# Patient Record
Sex: FEMALE | Race: WHITE | NOT HISPANIC OR LATINO | Employment: OTHER | URBAN - NONMETROPOLITAN AREA
[De-identification: names, ages, dates, MRNs, and addresses within clinical notes are randomized per-mention and may not be internally consistent; named-entity substitution may affect disease eponyms.]

---

## 2022-02-17 ENCOUNTER — APPOINTMENT (EMERGENCY)
Dept: CT IMAGING | Facility: HOSPITAL | Age: 82
DRG: 305 | End: 2022-02-17
Payer: COMMERCIAL

## 2022-02-17 ENCOUNTER — HOSPITAL ENCOUNTER (INPATIENT)
Facility: HOSPITAL | Age: 82
LOS: 1 days | Discharge: HOME HEALTH | DRG: 305 | End: 2022-02-19
Attending: STUDENT IN AN ORGANIZED HEALTH CARE EDUCATION/TRAINING PROGRAM | Admitting: STUDENT IN AN ORGANIZED HEALTH CARE EDUCATION/TRAINING PROGRAM
Payer: COMMERCIAL

## 2022-02-17 DIAGNOSIS — H53.9 VISUAL CHANGES: Primary | ICD-10-CM

## 2022-02-17 DIAGNOSIS — R29.90 STROKE-LIKE SYMPTOM: ICD-10-CM

## 2022-02-17 DIAGNOSIS — I63.9 CVA (CEREBRAL VASCULAR ACCIDENT) (HCC): ICD-10-CM

## 2022-02-17 LAB — GLUCOSE SERPL-MCNC: 177 MG/DL (ref 65–140)

## 2022-02-17 PROCEDURE — 70496 CT ANGIOGRAPHY HEAD: CPT

## 2022-02-17 PROCEDURE — 82948 REAGENT STRIP/BLOOD GLUCOSE: CPT

## 2022-02-17 PROCEDURE — 99291 CRITICAL CARE FIRST HOUR: CPT | Performed by: STUDENT IN AN ORGANIZED HEALTH CARE EDUCATION/TRAINING PROGRAM

## 2022-02-17 PROCEDURE — 70498 CT ANGIOGRAPHY NECK: CPT

## 2022-02-17 PROCEDURE — 99285 EMERGENCY DEPT VISIT HI MDM: CPT

## 2022-02-17 PROCEDURE — G1004 CDSM NDSC: HCPCS

## 2022-02-18 ENCOUNTER — APPOINTMENT (INPATIENT)
Dept: NON INVASIVE DIAGNOSTICS | Facility: HOSPITAL | Age: 82
DRG: 305 | End: 2022-02-18
Payer: COMMERCIAL

## 2022-02-18 ENCOUNTER — APPOINTMENT (INPATIENT)
Dept: CT IMAGING | Facility: HOSPITAL | Age: 82
DRG: 305 | End: 2022-02-18
Payer: COMMERCIAL

## 2022-02-18 ENCOUNTER — APPOINTMENT (INPATIENT)
Dept: MRI IMAGING | Facility: HOSPITAL | Age: 82
DRG: 305 | End: 2022-02-18
Payer: COMMERCIAL

## 2022-02-18 PROBLEM — H34.11 CENTRAL RETINAL ARTERY OCCLUSION OF RIGHT EYE: Status: ACTIVE | Noted: 2022-02-18

## 2022-02-18 PROBLEM — Z86.73 H/O: STROKE: Status: ACTIVE | Noted: 2022-02-18

## 2022-02-18 PROBLEM — I10 ESSENTIAL HYPERTENSION: Status: ACTIVE | Noted: 2022-02-18

## 2022-02-18 PROBLEM — R29.90 STROKE-LIKE SYMPTOM: Status: ACTIVE | Noted: 2022-02-18

## 2022-02-18 LAB
ANION GAP SERPL CALCULATED.3IONS-SCNC: 6 MMOL/L (ref 4–13)
AORTIC ROOT: 3.4 CM
APICAL FOUR CHAMBER EJECTION FRACTION: 69 %
APTT PPP: 27 SECONDS (ref 23–37)
ASCENDING AORTA: 3.4 CM (ref 1.96–2.94)
BUN SERPL-MCNC: 21 MG/DL (ref 5–25)
CALCIUM SERPL-MCNC: 8.2 MG/DL (ref 8.3–10.1)
CARDIAC TROPONIN I PNL SERPL HS: 3 NG/L
CHLORIDE SERPL-SCNC: 104 MMOL/L (ref 100–108)
CHOLEST SERPL-MCNC: 156 MG/DL
CO2 SERPL-SCNC: 28 MMOL/L (ref 21–32)
CREAT SERPL-MCNC: 0.93 MG/DL (ref 0.6–1.3)
CRP SERPL QL: 0.5 MG/L
E WAVE DECELERATION TIME: 160 MS
ERYTHROCYTE [DISTWIDTH] IN BLOOD BY AUTOMATED COUNT: 13.5 % (ref 11.6–15.1)
ERYTHROCYTE [SEDIMENTATION RATE] IN BLOOD: 3 MM/HOUR (ref 0–29)
EST. AVERAGE GLUCOSE BLD GHB EST-MCNC: 120 MG/DL
FRACTIONAL SHORTENING: 38 % (ref 28–44)
GFR SERPL CREATININE-BSD FRML MDRD: 57 ML/MIN/1.73SQ M
GLUCOSE SERPL-MCNC: 143 MG/DL (ref 65–140)
HBA1C MFR BLD: 5.8 %
HCT VFR BLD AUTO: 35.6 % (ref 34.8–46.1)
HDLC SERPL-MCNC: 49 MG/DL
HGB BLD-MCNC: 11.5 G/DL (ref 11.5–15.4)
INR PPP: 1.03 (ref 0.84–1.19)
INTERVENTRICULAR SEPTUM IN DIASTOLE (PARASTERNAL SHORT AXIS VIEW): 1.2 CM (ref 0.52–0.97)
INTERVENTRICULAR SEPTUM: 1.2 CM (ref 0.6–1.1)
LAAS-AP2: 14.8 CM2
LAAS-AP4: 21 CM2
LDLC SERPL CALC-MCNC: 85 MG/DL (ref 0–100)
LEFT ATRIUM SIZE: 2.9 CM
LEFT INTERNAL DIMENSION IN SYSTOLE: 2.3 CM (ref 2.61–3.96)
LEFT VENTRICLE DIASTOLIC VOLUME (MOD BIPLANE): 40 ML (ref 86.06–193.81)
LEFT VENTRICLE SYSTOLIC VOLUME (MOD BIPLANE): 9 ML
LEFT VENTRICULAR INTERNAL DIMENSION IN DIASTOLE: 3.7 CM (ref 4.27–6.36)
LEFT VENTRICULAR POSTERIOR WALL IN END DIASTOLE: 1 CM (ref 0.51–0.96)
LEFT VENTRICULAR STROKE VOLUME: 38 ML
LV EF: 78 %
LVSV (TEICH): 38 ML
MCH RBC QN AUTO: 29.6 PG (ref 26.8–34.3)
MCHC RBC AUTO-ENTMCNC: 32.3 G/DL (ref 31.4–37.4)
MCV RBC AUTO: 92 FL (ref 82–98)
MV E'TISSUE VEL-LAT: 9 CM/S
MV PEAK A VEL: 1.23 M/S
MV PEAK E VEL: 88 CM/S
MV STENOSIS PRESSURE HALF TIME: 47 MS
MV VALVE AREA P 1/2 METHOD: 4.68 CM2
PLATELET # BLD AUTO: 218 THOUSANDS/UL (ref 149–390)
PMV BLD AUTO: 11.2 FL (ref 8.9–12.7)
POTASSIUM SERPL-SCNC: 3.8 MMOL/L (ref 3.5–5.3)
PROTHROMBIN TIME: 13.4 SECONDS (ref 11.6–14.5)
RBC # BLD AUTO: 3.88 MILLION/UL (ref 3.81–5.12)
RIGHT ATRIAL 2D VOLUME: 14 ML
RIGHT ATRIUM AREA SYSTOLE A4C: 7.9 CM2
RIGHT VENTRICLE ID DIMENSION: 2.6 CM
SL CV LEFT ATRIUM LENGTH A2C: 5.3 CM
SL CV LV DIAS VOL ENDO Z SCORE: -3.71
SL CV LV EF: 65
SL CV PED ECHO LEFT VENTRICLE DIASTOLIC VOLUME (MOD BIPLANE) 2D: 56 ML
SL CV PED ECHO LEFT VENTRICLE SYSTOLIC VOLUME (MOD BIPLANE) 2D: 18 ML
SODIUM SERPL-SCNC: 138 MMOL/L (ref 136–145)
TR MAX PG: 26 MMHG
TR PEAK VELOCITY: 2.6 M/S
TRICUSPID VALVE PEAK REGURGITATION VELOCITY: 2.55 M/S
TRIGL SERPL-MCNC: 111 MG/DL
WBC # BLD AUTO: 6.66 THOUSAND/UL (ref 4.31–10.16)
Z-SCORE OF ASCENDING AORTA: 3.87
Z-SCORE OF INTERVENTRICULAR SEPTUM IN END DIASTOLE: 3.94
Z-SCORE OF LEFT VENTRICULAR DIMENSION IN END DIASTOLE: -3.43
Z-SCORE OF LEFT VENTRICULAR DIMENSION IN END SYSTOLE: -2.65
Z-SCORE OF LEFT VENTRICULAR POSTERIOR WALL IN END DIASTOLE: 2.32

## 2022-02-18 PROCEDURE — 97163 PT EVAL HIGH COMPLEX 45 MIN: CPT

## 2022-02-18 PROCEDURE — 83036 HEMOGLOBIN GLYCOSYLATED A1C: CPT | Performed by: PHYSICIAN ASSISTANT

## 2022-02-18 PROCEDURE — 70450 CT HEAD/BRAIN W/O DYE: CPT

## 2022-02-18 PROCEDURE — 08C0XZZ EXTIRPATION OF MATTER FROM RIGHT EYE, EXTERNAL APPROACH: ICD-10-PCS | Performed by: ANESTHESIOLOGY

## 2022-02-18 PROCEDURE — 80061 LIPID PANEL: CPT | Performed by: NURSE PRACTITIONER

## 2022-02-18 PROCEDURE — G1004 CDSM NDSC: HCPCS

## 2022-02-18 PROCEDURE — 80048 BASIC METABOLIC PNL TOTAL CA: CPT | Performed by: STUDENT IN AN ORGANIZED HEALTH CARE EDUCATION/TRAINING PROGRAM

## 2022-02-18 PROCEDURE — 96374 THER/PROPH/DIAG INJ IV PUSH: CPT

## 2022-02-18 PROCEDURE — 93306 TTE W/DOPPLER COMPLETE: CPT | Performed by: STUDENT IN AN ORGANIZED HEALTH CARE EDUCATION/TRAINING PROGRAM

## 2022-02-18 PROCEDURE — 92523 SPEECH SOUND LANG COMPREHEN: CPT

## 2022-02-18 PROCEDURE — 85027 COMPLETE CBC AUTOMATED: CPT | Performed by: STUDENT IN AN ORGANIZED HEALTH CARE EDUCATION/TRAINING PROGRAM

## 2022-02-18 PROCEDURE — 70551 MRI BRAIN STEM W/O DYE: CPT

## 2022-02-18 PROCEDURE — 86140 C-REACTIVE PROTEIN: CPT | Performed by: STUDENT IN AN ORGANIZED HEALTH CARE EDUCATION/TRAINING PROGRAM

## 2022-02-18 PROCEDURE — 85610 PROTHROMBIN TIME: CPT | Performed by: STUDENT IN AN ORGANIZED HEALTH CARE EDUCATION/TRAINING PROGRAM

## 2022-02-18 PROCEDURE — 97167 OT EVAL HIGH COMPLEX 60 MIN: CPT

## 2022-02-18 PROCEDURE — 36415 COLL VENOUS BLD VENIPUNCTURE: CPT | Performed by: STUDENT IN AN ORGANIZED HEALTH CARE EDUCATION/TRAINING PROGRAM

## 2022-02-18 PROCEDURE — 99291 CRITICAL CARE FIRST HOUR: CPT | Performed by: PHYSICIAN ASSISTANT

## 2022-02-18 PROCEDURE — G0427 INPT/ED TELECONSULT70: HCPCS | Performed by: PSYCHIATRY & NEUROLOGY

## 2022-02-18 PROCEDURE — 97116 GAIT TRAINING THERAPY: CPT

## 2022-02-18 PROCEDURE — 93306 TTE W/DOPPLER COMPLETE: CPT

## 2022-02-18 PROCEDURE — 85730 THROMBOPLASTIN TIME PARTIAL: CPT | Performed by: STUDENT IN AN ORGANIZED HEALTH CARE EDUCATION/TRAINING PROGRAM

## 2022-02-18 PROCEDURE — 93005 ELECTROCARDIOGRAM TRACING: CPT

## 2022-02-18 PROCEDURE — 92610 EVALUATE SWALLOWING FUNCTION: CPT

## 2022-02-18 PROCEDURE — 3E03317 INTRODUCTION OF OTHER THROMBOLYTIC INTO PERIPHERAL VEIN, PERCUTANEOUS APPROACH: ICD-10-PCS | Performed by: STUDENT IN AN ORGANIZED HEALTH CARE EDUCATION/TRAINING PROGRAM

## 2022-02-18 PROCEDURE — 84484 ASSAY OF TROPONIN QUANT: CPT | Performed by: STUDENT IN AN ORGANIZED HEALTH CARE EDUCATION/TRAINING PROGRAM

## 2022-02-18 PROCEDURE — 85652 RBC SED RATE AUTOMATED: CPT | Performed by: STUDENT IN AN ORGANIZED HEALTH CARE EDUCATION/TRAINING PROGRAM

## 2022-02-18 RX ORDER — AMLODIPINE BESYLATE 5 MG/1
5 TABLET ORAL DAILY
Status: DISCONTINUED | OUTPATIENT
Start: 2022-02-18 | End: 2022-02-19 | Stop reason: HOSPADM

## 2022-02-18 RX ORDER — PHENYLEPHRINE HCL 2.5 %
1 DROPS OPHTHALMIC (EYE) ONCE
Status: COMPLETED | OUTPATIENT
Start: 2022-02-18 | End: 2022-02-18

## 2022-02-18 RX ORDER — LOSARTAN POTASSIUM 50 MG/1
50 TABLET ORAL DAILY
COMMUNITY
Start: 2021-10-18

## 2022-02-18 RX ORDER — ASPIRIN 81 MG/1
81 TABLET ORAL DAILY
COMMUNITY

## 2022-02-18 RX ORDER — HYDRALAZINE HYDROCHLORIDE 20 MG/ML
5 INJECTION INTRAMUSCULAR; INTRAVENOUS EVERY 4 HOURS PRN
Status: DISCONTINUED | OUTPATIENT
Start: 2022-02-18 | End: 2022-02-19

## 2022-02-18 RX ORDER — TROPICAMIDE 10 MG/ML
1 SOLUTION/ DROPS OPHTHALMIC ONCE
Status: DISCONTINUED | OUTPATIENT
Start: 2022-02-18 | End: 2022-02-18

## 2022-02-18 RX ORDER — CLONIDINE 0.3 MG/24H
0.3 PATCH, EXTENDED RELEASE TRANSDERMAL
COMMUNITY
Start: 2022-01-07

## 2022-02-18 RX ORDER — CLONIDINE 0.3 MG/24H
1 PATCH, EXTENDED RELEASE TRANSDERMAL WEEKLY
Status: DISCONTINUED | OUTPATIENT
Start: 2022-02-21 | End: 2022-02-19 | Stop reason: HOSPADM

## 2022-02-18 RX ORDER — LABETALOL 20 MG/4 ML (5 MG/ML) INTRAVENOUS SYRINGE
20 ONCE
Status: COMPLETED | OUTPATIENT
Start: 2022-02-18 | End: 2022-02-18

## 2022-02-18 RX ORDER — LANOLIN ALCOHOL/MO/W.PET/CERES
3 CREAM (GRAM) TOPICAL
Status: DISCONTINUED | OUTPATIENT
Start: 2022-02-18 | End: 2022-02-19 | Stop reason: HOSPADM

## 2022-02-18 RX ORDER — MAGNESIUM SULFATE 1 G/100ML
1 INJECTION INTRAVENOUS ONCE
Status: COMPLETED | OUTPATIENT
Start: 2022-02-18 | End: 2022-02-18

## 2022-02-18 RX ORDER — LABETALOL 20 MG/4 ML (5 MG/ML) INTRAVENOUS SYRINGE
10 ONCE
Status: COMPLETED | OUTPATIENT
Start: 2022-02-18 | End: 2022-02-18

## 2022-02-18 RX ORDER — ASPIRIN 81 MG/1
81 TABLET ORAL DAILY
Status: DISCONTINUED | OUTPATIENT
Start: 2022-02-19 | End: 2022-02-19 | Stop reason: HOSPADM

## 2022-02-18 RX ORDER — LOSARTAN POTASSIUM 50 MG/1
50 TABLET ORAL DAILY
Status: DISCONTINUED | OUTPATIENT
Start: 2022-02-18 | End: 2022-02-19 | Stop reason: HOSPADM

## 2022-02-18 RX ORDER — ATORVASTATIN CALCIUM 40 MG/1
40 TABLET, FILM COATED ORAL EVERY EVENING
Status: DISCONTINUED | OUTPATIENT
Start: 2022-02-18 | End: 2022-02-19 | Stop reason: HOSPADM

## 2022-02-18 RX ORDER — HYDRALAZINE HYDROCHLORIDE 20 MG/ML
10 INJECTION INTRAMUSCULAR; INTRAVENOUS ONCE
Status: COMPLETED | OUTPATIENT
Start: 2022-02-18 | End: 2022-02-18

## 2022-02-18 RX ORDER — LABETALOL 20 MG/4 ML (5 MG/ML) INTRAVENOUS SYRINGE
10 EVERY 4 HOURS PRN
Status: DISCONTINUED | OUTPATIENT
Start: 2022-02-18 | End: 2022-02-19

## 2022-02-18 RX ORDER — TRIAMCINOLONE ACETONIDE 5 MG/G
CREAM TOPICAL 2 TIMES DAILY
Status: DISCONTINUED | OUTPATIENT
Start: 2022-02-18 | End: 2022-02-19 | Stop reason: HOSPADM

## 2022-02-18 RX ORDER — AMLODIPINE BESYLATE 5 MG/1
5 TABLET ORAL DAILY
COMMUNITY
Start: 2021-10-18

## 2022-02-18 RX ADMIN — LABETALOL HYDROCHLORIDE 20 MG: 5 INJECTION, SOLUTION INTRAVENOUS at 03:35

## 2022-02-18 RX ADMIN — IOHEXOL 100 ML: 350 INJECTION, SOLUTION INTRAVENOUS at 00:11

## 2022-02-18 RX ADMIN — LOSARTAN POTASSIUM 50 MG: 50 TABLET, FILM COATED ORAL at 04:38

## 2022-02-18 RX ADMIN — ALTEPLASE 63.4 MG: KIT at 00:43

## 2022-02-18 RX ADMIN — LABETALOL HYDROCHLORIDE 10 MG: 5 INJECTION, SOLUTION INTRAVENOUS at 01:03

## 2022-02-18 RX ADMIN — LABETALOL HYDROCHLORIDE 20 MG: 5 INJECTION, SOLUTION INTRAVENOUS at 22:08

## 2022-02-18 RX ADMIN — TRIAMCINOLONE ACETONIDE 1 APPLICATION: 5 CREAM TOPICAL at 18:39

## 2022-02-18 RX ADMIN — SODIUM CHLORIDE 50 ML: 9 INJECTION, SOLUTION INTRAVENOUS at 01:47

## 2022-02-18 RX ADMIN — HYDRALAZINE HYDROCHLORIDE 5 MG: 20 INJECTION INTRAMUSCULAR; INTRAVENOUS at 02:56

## 2022-02-18 RX ADMIN — PHENYLEPHRINE HYDROCHLORIDE 1 DROP: 25 SOLUTION/ DROPS OPHTHALMIC at 01:35

## 2022-02-18 RX ADMIN — HYDRALAZINE HYDROCHLORIDE 5 MG: 20 INJECTION INTRAMUSCULAR; INTRAVENOUS at 21:17

## 2022-02-18 RX ADMIN — FLUORESCEIN SODIUM 1 STRIP: 1 STRIP OPHTHALMIC at 06:01

## 2022-02-18 RX ADMIN — MAGNESIUM SULFATE HEPTAHYDRATE 1 G: 1 INJECTION, SOLUTION INTRAVENOUS at 06:42

## 2022-02-18 RX ADMIN — LABETALOL HYDROCHLORIDE 10 MG: 5 INJECTION, SOLUTION INTRAVENOUS at 01:49

## 2022-02-18 RX ADMIN — MELATONIN TAB 3 MG 3 MG: 3 TAB at 22:22

## 2022-02-18 RX ADMIN — HYDRALAZINE HYDROCHLORIDE 10 MG: 20 INJECTION INTRAMUSCULAR; INTRAVENOUS at 04:50

## 2022-02-18 RX ADMIN — AMLODIPINE BESYLATE 5 MG: 5 TABLET ORAL at 04:38

## 2022-02-18 RX ADMIN — TRIAMCINOLONE ACETONIDE: 5 CREAM TOPICAL at 11:13

## 2022-02-18 RX ADMIN — ATORVASTATIN CALCIUM 40 MG: 40 TABLET, FILM COATED ORAL at 18:39

## 2022-02-18 NOTE — NURSING NOTE
Patient reporting irritation to right eye after PA-C removed foreign object from eye  Ice pack applied  Patient then reporting leg cramp to left calf  RN rubbed leg as requested by patient in an attempt to alleviate cramping which initially worked, but then cramp returned  Patient requesting to stand at edge of bed  Education provided on fall prevention and safety prior to RN assisting patient with standing at bedside while using roller walker  which patient reports alleviated cramping  Patient then assisted to [facility] to void and returned to bed  Patient requesting to leave SCD's off for a few hours due to cramping, stating she feels like her movement is limited with SCD's on and wants to move legs to prevent cramping  Patient reports feeling much better and was instructed to alert staff to any needs

## 2022-02-18 NOTE — PLAN OF CARE
Problem: SAFETY ADULT  Goal: Maintain or return to baseline ADL function  Description: INTERVENTIONS:  -  Assess patient's ability to carry out ADLs; assess patient's baseline for ADL function and identify physical deficits which impact ability to perform ADLs (bathing, care of mouth/teeth, toileting, grooming, dressing, etc )  - Assess/evaluate cause of self-care deficits   - Assess range of motion  - Assess patient's mobility; develop plan if impaired  - Assess patient's need for assistive devices and provide as appropriate  - Encourage maximum independence but intervene and supervise when necessary  - Involve family in performance of ADLs  - Assess for home care needs following discharge   - Consider OT consult to assist with ADL evaluation and planning for discharge  - Provide patient education as appropriate  Outcome: Progressing     Problem: Neurological Deficit  Goal: Neurological status is stable or improving  Description: Interventions:  - Monitor and assess patient's level of consciousness, motor function, sensory function, and level of assistance needed for ADLs  - Monitor and report changes from baseline  Collaborate with interdisciplinary team to initiate plan and implement interventions as ordered  - Provide and maintain a safe environment  - Consider seizure precautions  - Consider fall precautions  - Consider aspiration precautions  - Consider bleeding precautions    Outcome: Progressing

## 2022-02-18 NOTE — NURSING NOTE
Patient reporting she feels as if she has something in her right eye  Upon assessment, RN noted mild edema and redness to upper right eyelid  Provider aware

## 2022-02-18 NOTE — ASSESSMENT & PLAN NOTE
· Labetalol ordered p r n  10 mg IV q 4 hours for blood pressure greater 574 systolic or 90 diastolic status post tPA  · Continue home medications of Norvasc, Catapres and Cozaar

## 2022-02-18 NOTE — H&P
[address]  H&P- [address] 1940, 78 y o  female MRN: [id number]  Unit/Bed#: ED 05 Encounter: [id number]  Primary Care Provider: Odell Gonzalez MD   Date and time admitted to hospital: 2/17/2022 11:25 PM    H/O: stroke  Assessment & Plan  · Stroke 2009  · Residual deficit of left eye blindness  · Continue aspirin when able  Essential hypertension  Assessment & Plan  · Labetalol ordered p r n  10 mg IV q 4 hours for blood pressure greater 557 systolic or 90 diastolic status post tPA  · Continue home medications of Norvasc, Catapres and Cozaar  * Stroke-like symptom  Assessment & Plan  70-year-old female who presented on 02/17/2022 for visual deficits  Significant past medical history of hypertension and stroke  She complained of right eye visual changes since 7:00 p m  Jared Swain · Neurology and ophthalmology consult upon evaluation in the emergency department  There was not ophthalmalgic source identified for the visual loss  Neurology recommended tPA and was administered  · CT head:  No acute intracranial findings  · CTA head/neck:  Show no hemodynamically significant stenosis, dissection or occlusion of the carotid or vertebral arteries  No intracranial aneurysm  Vessel irregularity throughout the anterior posterior circulation with vessel irregularity involving the right M1 segment and the bilateral posterior cerebral arteries more distal branches  Moderate to severe atherosclerotic disease also seen involving the intracranial portions of the bilateral internal carotid arteries notably the supraclinoid portions  · MRI ordered  · Echo pending  · No needle sticks, anticoagulation or anti-platelet agents for 24 hours  · Neuro checks per ischemic stroke protocol with reperfusion therapy  · Labetalol 10 mg IV q 4 hours for systolic blood pressure greater than 592 or diastolic blood pressure greater than 90   · PT/OT evaluations ordered  -------------------------------------------------------------------------------------------------------------  Chief Complaint:  Right eye visual disturbance    History of Present Illness   HX and PE limited by:  Nani    Cheikh Distance is a 78 y o  female who presents with a past medical history of stroke in 2009 and hypertension  Patient has residual left eye blindness from her previous stroke  On the evening of 02/18/2022 at approximately 7:00 p m  the patient noticed that the vision in her right eye had changed where everything was blurry  She denies any other symptoms to include extremity weakness, dysarthria, dysphagia and facial droop  Upon evaluation in the emergency department, ophthalmology and neurology were contacted  A workup was completed to rule out any ophthalmologic pathologies of her right eye visual disturbance  There was no ophthalmologic pathology identified  Neurology recommended administering tPA  TPA was administered and completed around 12:43 a m  [facility] was contacted for admission  History obtained from chart review and the patient   -------------------------------------------------------------------------------------------------------------  Dispo: Admit to Critical Care     Code Status: Level 1 - Full Code  --------------------------------------------------------------------------------------------------------------  Review of Systems   Constitutional: Negative  HENT: Negative  Eyes: Positive for visual disturbance  Negative for pain, discharge and itching  Respiratory: Negative  Cardiovascular: Negative  Gastrointestinal: Negative  Endocrine: Negative  Genitourinary: Negative  Neurological: Negative for seizures, facial asymmetry, speech difficulty, weakness and headaches  All other systems reviewed and are negative  Physical Exam  Constitutional:       General: She is not in acute distress       Appearance: She is not ill-appearing or diaphoretic  HENT:      Head: Normocephalic and atraumatic  Right Ear: External ear normal       Left Ear: External ear normal       Nose: Nose normal       Mouth/Throat:      Mouth: Mucous membranes are moist       Pharynx: Oropharynx is clear  Eyes:      General: No scleral icterus  Right eye: No discharge  Left eye: No discharge  Conjunctiva/sclera: Conjunctivae normal       Comments: Pupils dilated in ED for examination  Both pupils dilated, round, equal  No vision in left eye  Blurry vision right eye  Difficult to assess with eyes dilated  Cardiovascular:      Rate and Rhythm: Normal rate and regular rhythm  Pulses: Normal pulses  Heart sounds: No friction rub  No gallop  Pulmonary:      Effort: Pulmonary effort is normal  No respiratory distress  Breath sounds: No stridor  No wheezing, rhonchi or rales  Chest:      Chest wall: No tenderness  Abdominal:      General: Bowel sounds are normal  There is no distension  Palpations: Abdomen is soft  There is no mass  Tenderness: There is no abdominal tenderness  There is no guarding or rebound  Musculoskeletal:         General: No swelling, tenderness, deformity or signs of injury  Normal range of motion  Cervical back: Normal range of motion and neck supple  No rigidity or tenderness  Right lower leg: No edema  Left lower leg: No edema  Skin:     General: Skin is warm and dry  Capillary Refill: Capillary refill takes less than 2 seconds  Coloration: Skin is not jaundiced or pale  Findings: No rash  Neurological:      General: No focal deficit present  Mental Status: She is alert and oriented to person, place, and time  GCS: GCS eye subscore is 4  GCS verbal subscore is 5  GCS motor subscore is 6  Sensory: No sensory deficit  --------------------------------------------------------------------------------------------------------------  Vitals:   Vitals:    02/18/22 0030 02/18/22 0045 02/18/22 0100 02/18/22 0115   BP: (!) 179/88 (!) 184/83 (!) 190/92 164/79   Pulse: 92 91 86 80   Resp: 17 18 20 18   Temp:       TempSrc:   Temporal Temporal   SpO2: 99% 98% 96% 98%   Weight:         Temp  Min: 97 4 °F (36 3 °C)  Max: 97 4 °F (36 3 °C)        There is no height or weight on file to calculate BMI  Laboratory and Diagnostics:  Results from last 7 days   Lab Units 02/18/22  0011   WBC Thousand/uL 6 66   HEMOGLOBIN g/dL 11 5   HEMATOCRIT % 35 6   PLATELETS Thousands/uL 218     Results from last 7 days   Lab Units 02/18/22  0011   SODIUM mmol/L 138   POTASSIUM mmol/L 3 8   CHLORIDE mmol/L 104   CO2 mmol/L 28   ANION GAP mmol/L 6   BUN mg/dL 21   CREATININE mg/dL 0 93   CALCIUM mg/dL 8 2*   GLUCOSE RANDOM mg/dL 143*          Results from last 7 days   Lab Units 02/18/22  0011   INR  1 03   PTT seconds 27              ABG:    VBG:          Micro:        EKG: NSR  Imaging: I have personally reviewed pertinent reports  and I have personally reviewed pertinent films in PACS      Historical Information   Past Medical History:   Diagnosis Date    Hypertension     Stroke [facility])      History reviewed  No pertinent surgical history  Social History   Social History     Substance and Sexual Activity   Alcohol Use Never     Social History     Substance and Sexual Activity   Drug Use Never     Social History     Tobacco Use   Smoking Status Never Smoker   Smokeless Tobacco Never Used     Exercise History:  Performs independent ADLs  Family History:   History reviewed  No pertinent family history      Medications:  Current Facility-Administered Medications   Medication Dose Route Frequency    atorvastatin (LIPITOR) tablet 40 mg  40 mg Oral QPM    Labetalol HCl (NORMODYNE) injection 10 mg  10 mg Intravenous Q4H PRN    sodium chloride 0 9 % bolus 50 mL  50 mL Intravenous Once     Home medications:  Prior to Admission Medications   Prescriptions Last Dose Informant Patient Reported? Taking? amLODIPine (NORVASC) 5 mg tablet   Yes Yes   Sig: Take 5 mg by mouth daily   aspirin (ECOTRIN LOW STRENGTH) 81 mg EC tablet   Yes No   Sig: Take 81 mg by mouth daily   cloNIDine (CATAPRES-TTS-3) 0 3 mg/24 hr   Yes Yes   Si 3 patches   losartan (COZAAR) 50 mg tablet   Yes Yes   Sig: Take 50 mg by mouth daily      Facility-Administered Medications: None     Allergies:  No Known Allergies    ------------------------------------------------------------------------------------------------------------  Advance Directive and Living Will:      Power of :    POLST:    ------------------------------------------------------------------------------------------------------------  Anticipated Length of Stay is > 2 midnights    Care Time Delivered:   Upon my evaluation, this patient had a high probability of imminent or life-threatening deterioration due to Stroke symptoms receiving tPA, which required my direct attention, intervention, and personal management  I have personally provided 45 minutes (0100 to 0145) of critical care time, exclusive of procedures, teaching, family meetings, and any prior time recorded by providers other than myself  [employer], PA-C        Portions of the record may have been created with voice recognition software  Occasional wrong word or "sound a like" substitutions may have occurred due to the inherent limitations of voice recognition software    Read the chart carefully and recognize, using context, where substitutions have occurred

## 2022-02-18 NOTE — CASE MANAGEMENT
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      LOS (days): 0  Geometric Mean LOS (GMLOS) (days): 2 20  Days to GMLOS:1 5     OBJECTIVE:  Risk of Unplanned Readmission Score: 9         Current admission status: Inpatient   Preferred Pharmacy:   [address]  [facility] [address] [facility] [address]  [address], [place]  [address] [address]  Phone: [phone] Fax: [phone]    Primary Care Provider: Smooth Ibarra MD    Primary Insurance: Kirsty Burris [facility]  Secondary Insurance:     DISCHARGE DETAILS    - Noted recommendation is [facility]  - Patient is in MRI-   CM will follow up with choices tomorrow:

## 2022-02-18 NOTE — PLAN OF CARE
Problem: OCCUPATIONAL THERAPY ADULT  Goal: Performs self-care activities at highest level of function for planned discharge setting  See evaluation for individualized goals  Description: Treatment Interventions: ADL retraining,Functional transfer training,Visual perceptual retraining,UE strengthening/ROM,Endurance training,Patient/family training,Equipment evaluation/education,Neuromuscular reeducation,Compensatory technique education,Continued evaluation,Energy conservation,Activityengagement          See flowsheet documentation for full assessment, interventions and recommendations  Note: Limitation: Decreased ADL status,Decreased UE strength,Decreased Safe judgement during ADL,Decreased endurance,Visual deficit,Decreased self-care trans,Decreased high-level ADLs  Prognosis: Good  Assessment: Pt is a 78 y o  female, admitted to [address] [place] [address] 2/17/2022 d/t experiencing R eye vision changes  Dx: stroke-like symptoms  Pt with PMHx impacting their performance during ADL tasks, including: HTN, CVA  Prior to admission to the hospital Pt was performing ADLs without physical assistance  IADLs without physical assistance  Functional transfers/ambulation without physical assistance  Cognitive status was PTA was intact  OT order placed to assess Pt's ADLs, cognitive status, and performance during functional tasks in order to maximize safety and independence while making most appropriate d/c recommendations   Pt's clinical presentation is currently unstable/unpredictable given new onset deficits that effect Pt's occupational performance and ability to safely return to [facility] including decrease activity tolerance, decrease standing balance, decrease performance during ADL tasks, decrease safety awareness , decrease UB MS, decrease generalized strength, decrease activity engagement, decrease performance during functional transfers, high fall risk and limited insight to deficits combined with medical complications of hypertension , increased RR, impulsivity during admission, need for input for mobility technique/safety and new visual changes, recieved tPA  Personal factors affecting Pt at time of initial evaluation include: advanced age, inability to perform current job functions, inability to perform IADLs, inability to perform ADLs, new need for AD, inability to ambulate household distances, inability to navigate community distances, limited insight into impairments, decreased initiation and engagement and questionable non-compliance  Pt will benefit from continued skilled OT services to address deficits as defined above and to maximize level independence/participation during ADLs and functional tasks to facilitate return toward PLOF and improved quality of life  From an occupational therapy standpoint, recommendation at time of d/c would be [address] with increased family support       OT Discharge Recommendation: Home with home health rehabilitation

## 2022-02-18 NOTE — ASSESSMENT & PLAN NOTE
68-year-old female who presented on 02/17/2022 for visual deficits  Significant past medical history of hypertension and stroke  She complained of right eye visual changes since 7:00 p m  Carlo Soto · Neurology and ophthalmology consult upon evaluation in the emergency department  There was not ophthalmalgic source identified for the visual loss  Neurology recommended tPA and was administered  · CT head:  No acute intracranial findings  · CTA head/neck:  Show no hemodynamically significant stenosis, dissection or occlusion of the carotid or vertebral arteries  No intracranial aneurysm  Vessel irregularity throughout the anterior posterior circulation with vessel irregularity involving the right M1 segment and the bilateral posterior cerebral arteries more distal branches  Moderate to severe atherosclerotic disease also seen involving the intracranial portions of the bilateral internal carotid arteries notably the supraclinoid portions  · MRI ordered  · Echo pending  · No needle sticks, anticoagulation or anti-platelet agents for 24 hours  · Neuro checks per ischemic stroke protocol with reperfusion therapy  · Labetalol 10 mg IV q 4 hours for systolic blood pressure greater than 673 or diastolic blood pressure greater than 90   · PT/OT evaluations ordered

## 2022-02-18 NOTE — PLAN OF CARE
Problem: PHYSICAL THERAPY ADULT  Goal: Performs mobility at highest level of function for planned discharge setting  See evaluation for individualized goals  Description: Treatment/Interventions: Functional transfer training,LE strengthening/ROM,Elevations,Therapeutic exercise,Endurance training,Patient/family training,Equipment eval/education,Bed mobility,Gait training,Compensatory technique education,Spoke to nursing,Spoke to case management,OT  Equipment Recommended:  (cane versus RW-pt has both)       See flowsheet documentation for full assessment, interventions and recommendations  Note: Prognosis: Good  Problem List: Decreased strength,Decreased endurance,Impaired balance,Decreased mobility,Decreased coordination,Decreased safety awareness,Impaired judgement,Impaired vision  Assessment: Pt is a 78 y o  female seen for PT evaluation s/p admission to [address] [place] [address] on 2/17/2022 with Stroke-like symptom  Order placed for PT services  Upon evaluation: Pt is presenting with impaired functional mobility due to decreased strength, decreased endurance, impaired balance, impaired coordination, gait deviations, decreased safety awareness, impaired judgment, visual impairment and fall risk requiring steadying to min assistance for transfers and steadying to min assistance for ambulation with out AD   Pt's clinical presentation is currently unstable/unpredictable given the functional mobility deficits above, especially weakness, decreased endurance, impaired coordination, gait deviations, decreased activity tolerance, decreased functional mobility tolerance, decreased safety awareness and impaired judgement, coupled with fall risks as indicated by AM-PAC 6-Clicks: 60/48 as well as impaired balance, polypharmacy, impaired coordination, impaired judgement, decreased safety awareness and altered vision and combined with medical complications of abnormal blood sugars, need for input for mobility technique/safety and LOB wiht ambulation  Pt's PMHx and comorbidities that may affect physical performance and progress include: HTN, CVA and limited vision  Personal factors affecting pt at time of IE include: step(s) to enter environment, limited home support, advanced age, inability to perform IADLs, inability to perform ADLs, inability to navigate level surfaces without external assistance and limited insight into impairments  Pt will benefit from continued skilled PT services to address deficits as defined above and to maximize level of functional mobility to facilitate return toward PLOF and improved QOL  From PT/mobility standpoint, recommendation at time of d/c would be Home PT, home with family support and with cane pending progress in order to reduce fall risk and maximize pt's functional independence and consistency with mobility in order to facilitate return to PLOF  Recommend trial with walker next 1-2 sessions, trial with cane next 1-2 sessions and ther ex next 1-2 sessions  Barriers to Discharge: Decreased caregiver support  Barriers to Discharge Comments: lives alone, impaired vision  pt reports family can assist      PT Discharge Recommendation: Home with home health rehabilitation          See flowsheet documentation for full assessment

## 2022-02-18 NOTE — ED PROVIDER NOTES
History  Chief Complaint   Patient presents with    Decreased Visual Acuity     Patient complaining of decreased visual acuity in right eye around 7pm  Patient took losartan and amlodipine around 8pm for elevated blood pressure  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  History provided by:  Patient  Eye Problem  Location:  Right eye  Severity:  Mild  Onset quality:  Sudden  Duration:  3 hours  Timing:  Constant  Progression:  Unchanged  Chronicity:  New  Context: not direct trauma and not foreign body    Relieved by:  Nothing  Worsened by:  Nothing  Ineffective treatments:  None tried  Associated symptoms: decreased vision    Associated symptoms: no blurred vision, no headaches, no itching, no nausea, no numbness, no photophobia, no redness, no tearing, no tingling, no vomiting and no weakness       56-year-old female  History of left eye blindness status post CVA, hypertension on losartan/Norvasc  Presents to the emergency department with decreased visual acuity in her right eye  She states that at 2015 this evening, she developed worsening vision from her right eye  She denies headache  She took her blood pressure which showed systolic blood pressures greater than 200--she took an additional dose of Norvasc/losartan  The patient has been having persistent right eye blindness since that time  At baseline, the patient is able to read and drives a vehicle  She denies headache, recent falls, slurred speech, chest pain, shortness of breath  Stroke alert called  Past Medical History:   Diagnosis Date    Hypertension     Stroke [facility])      History reviewed  No pertinent surgical history  History reviewed  No pertinent family history  I have reviewed and agree with the history as documented      E-Cigarette/Vaping    E-Cigarette Use Never User      E-Cigarette/Vaping Substances     Social History     Tobacco Use    Smoking status: Never Smoker    Smokeless tobacco: Never Used   Vaping Use  Vaping Use: Never used   Substance Use Topics    Alcohol use: Never    Drug use: Never       Review of Systems   Unable to perform ROS: Acuity of condition   Eyes: Positive for visual disturbance  Negative for blurred vision, photophobia, pain, redness and itching  Gastrointestinal: Negative for nausea and vomiting  Neurological: Negative for dizziness, tingling, syncope, facial asymmetry, speech difficulty, weakness, light-headedness, numbness and headaches  Hematological: Bruises/bleeds easily  All other systems reviewed and are negative  Physical Exam  Physical Exam  Vitals and nursing note reviewed  Constitutional:       General: She is not in acute distress  Appearance: She is not ill-appearing or toxic-appearing  HENT:      Head: Normocephalic and atraumatic  Right Ear: External ear normal       Left Ear: External ear normal       Nose: No congestion or rhinorrhea  Eyes:      Extraocular Movements: Extraocular movements intact  Pupils: Pupils are equal, round, and reactive to light  Funduscopic exam:     Right eye: No hemorrhage or papilledema  Red reflex present  Slit lamp exam:     Right eye: No foreign body  Cardiovascular:      Rate and Rhythm: Normal rate and regular rhythm  Pulses: Normal pulses  Heart sounds: Normal heart sounds  Pulmonary:      Effort: Pulmonary effort is normal       Breath sounds: Normal breath sounds  No wheezing or rhonchi  Chest:      Chest wall: No tenderness  Abdominal:      General: Abdomen is flat  Palpations: Abdomen is soft  Tenderness: There is no abdominal tenderness  There is no guarding or rebound  Musculoskeletal:         General: No swelling or tenderness  Cervical back: Neck supple  No tenderness  Skin:     General: Skin is warm and dry  Capillary Refill: Capillary refill takes less than 2 seconds  Coloration: Skin is not jaundiced or pale        Findings: No bruising, erythema, lesion or rash  Neurological:      Mental Status: She is alert and oriented to person, place, and time  Mental status is at baseline  Cranial Nerves: No cranial nerve deficit  Sensory: No sensory deficit  Motor: No weakness  Psychiatric:         Mood and Affect: Mood normal          Behavior: Behavior normal          Thought Content:  Thought content normal          Judgment: Judgment normal          Vital Signs  ED Triage Vitals [02/17/22 2328]   Temperature Pulse Respirations Blood Pressure SpO2   (!) 97 4 °F (36 3 °C) 103 16 (!) 207/91 98 %      Temp Source Heart Rate Source Patient Position - Orthostatic VS BP Location FiO2 (%)   Temporal Monitor Sitting Left arm --      Pain Score       --         Vitals:    02/17/22 2328   BP: (!) 207/91   Pulse: 103   Patient Position - Orthostatic VS: Sitting     ED Medications  Medications   alteplase (ACTIVASE) IV bolus 7 047 mg (7 047 mg Intravenous Given 2/18/22 0039)     Followed by   alteplase (ACTIVASE) infusion 63 4 mg (63 4 mg Intravenous New Bag 2/18/22 0043)     Followed by   sodium chloride 0 9 % bolus 50 mL (has no administration in time range)   phenylephrine (PATRICIA-SYNEPHRINE) 2 5 % ophthalmic solution 1 drop (has no administration in time range)   iohexol (OMNIPAQUE) 350 MG/ML injection (SINGLE-DOSE) 100 mL (100 mL Intravenous Given 2/18/22 0011)   Labetalol HCl (NORMODYNE) injection 10 mg (10 mg Intravenous Given 2/18/22 0103)     Diagnostic Studies  Results Reviewed     Procedure Component Value Units Date/Time    HS Troponin I 2hr [[id number]]     Lab Status: No result Specimen: Blood     HS Troponin 0hr (reflex protocol) [[id number]]  (Normal) Collected: 02/18/22 0011    Lab Status: Final result Specimen: Blood from Arm, Right Updated: 02/18/22 0045     hs TnI 0hr 3 ng/L     C-reactive protein [[id number]]  (Normal) Collected: 02/18/22 0011    Lab Status: Final result Specimen: Blood from Arm, Right Updated: 02/18/22 0037     CRP 0 5 mg/L     APTT [[id number]]  (Normal) Collected: 02/18/22 0011    Lab Status: Final result Specimen: Blood from Arm, Right Updated: 02/18/22 0037     PTT 27 seconds     Protime-INR [[id number]]  (Normal) Collected: 02/18/22 0011    Lab Status: Final result Specimen: Blood from Arm, Right Updated: 02/18/22 0037     Protime 13 4 seconds      INR 0 63    Basic metabolic panel [[id number]]  (Abnormal) Collected: 02/18/22 0011    Lab Status: Final result Specimen: Blood from Arm, Right Updated: 02/18/22 0036     Sodium 138 mmol/L      Potassium 3 8 mmol/L      Chloride 104 mmol/L      CO2 28 mmol/L      ANION GAP 6 mmol/L      BUN 21 mg/dL      Creatinine 0 93 mg/dL      Glucose 143 mg/dL      Calcium 8 2 mg/dL      eGFR 57 ml/min/1 73sq m     Narrative:      [place] guidelines for Chronic Kidney Disease (CKD):     Stage 1 with normal or high GFR (GFR > 90 mL/min/1 73 square meters)    Stage 2 Mild CKD (GFR = 60-89 mL/min/1 73 square meters)    Stage 3A Moderate CKD (GFR = 45-59 mL/min/1 73 square meters)    Stage 3B Moderate CKD (GFR = 30-44 mL/min/1 73 square meters)    Stage 4 Severe CKD (GFR = 15-29 mL/min/1 73 square meters)    Stage 5 End Stage CKD (GFR <15 mL/min/1 73 square meters)  Note: GFR calculation is accurate only with a steady state creatinine    Sedimentation rate, automated [[id number]]  (Normal) Collected: 02/18/22 0011    Lab Status: Final result Specimen: Blood from Arm, Right Updated: 02/18/22 0026     Sed Rate 3 mm/hour     CBC and Platelet [[id number]]  (Normal) Collected: 02/18/22 0011    Lab Status: Final result Specimen: Blood from Arm, Right Updated: 02/18/22 0017     WBC 6 66 Thousand/uL      RBC 3 88 Million/uL      Hemoglobin 11 5 g/dL      Hematocrit 35 6 %      MCV 92 fL      MCH 29 6 pg      MCHC 32 3 g/dL      RDW 13 5 %      Platelets 891 Thousands/uL      MPV 11 2 fL     Fingerstick Glucose (POCT) [[id number]]  (Abnormal) Collected: 02/17/22 2349    Lab Status: Final result Updated: 02/17/22 2353     POC Glucose 177 mg/dl              CTA stroke alert (head/neck)   Final Result by Michelle Noel MD (02/18 0030)      No hemodynamically significant stenosis, dissection or occlusion of the carotid or vertebral arteries  No intracranial aneurysm  Vessel irregularity throughout the anterior and posterior circulation with vessel irregularity involving the right M1 segment and the bilateral posterior cerebral arteries and more distal branches  Moderate to severe    atherosclerotic disease also seen involving the intracranial portions of the bilateral internal carotid arteries notably the supraclinoid portions  Findings were directly discussed with Dr Pio Alvarez at 12:20 AM, 2/18/2022  Workstation performed: [id number]         CT stroke alert brain   Final Result by Michelle Noel MD (02/18 0030)      No acute intracranial abnormality  Findings were directly discussed with Dr Pio Alvarez at 12:20 AM, 2/18/2022  Workstation performed: [id number]                Procedures  POC Ocular US    Date/Time: 2/18/2022 12:35 AM  Performed by: Monster Rivera DO  Authorized by: Monster Rivera DO     Patient location:  ED  Performed by: Attending  Other Assisting Provider: No    Procedure details:     Exam Type:  Diagnostic    Indications: visual change      Assessment for: retinal detachment and vitreous hemorrhage      Eye(s) assessed:  Right eye    Structures visualized: anterior chamber, posterior chamber and lens      Image quality: diagnostic      Image availability:  Not saved  Right eye findings:     Foreign body: not identified      Retinal contour: normal      Lens: indeterminate      Vitreous body comment:  Mild vitreous swirling with eye movement  Interpretation:     Ocular US impressions: indeterminate      CriticalCare Time  Performed by: Monster Rivera DO  Authorized by:  Monster Rivera DO     Critical care provider statement:     Critical care time (minutes):  65    Critical care was necessary to treat or prevent imminent or life-threatening deterioration of the following conditions:  CNS failure or compromise    Critical care was time spent personally by me on the following activities:  Blood draw for specimens, obtaining history from patient or surrogate, discussions with consultants, evaluation of patient's response to treatment, examination of patient, review of old charts, re-evaluation of patient's condition, ordering and review of radiographic studies, ordering and review of laboratory studies and ordering and performing treatments and interventions      ED Course  ED Course as of 02/18/22 0052 Fri Feb 18, 2022 0050 Discussed the case with Dr Alex Lock (ophthalmology) who recommended ruling out giant cell arteritis, vitreous hemorrhage/detachment, retinal detachment, central artery vein/artery occlusion  Bedside ultrasound negative for signs of vitreous/retinal detachment  CRP/ESR negative  MDM     78year old F  Hx of CVA with residual left eye blindness  Presents to the ED with right visual changes  The visual defects started approximately 3 hours prior to arrival in the ED  Stroke alert called  The patient was able to see light and outlines but able to see anything clearly  Stroke alert called  CT/CTA interpretation above  The case was discussed with Neurology and Ophthalmology  GCA ruled out with negative inflammatory markers  The patient was administered tPA for possible CVA  Bedside US showing possible vitreous swirling which may indicate a possible vitreous hemorrhage  No signs of retinal or vitreous detachment on US  The patient was administered a cycloplegic medication--no signs of CV/AO  The patient was administered IV Labetalol PRN  Admitted to the ICU       Disposition  Final diagnoses:   Visual changes   CVA (cerebral vascular accident) [facility])     Time reflects when diagnosis was documented in both MDM as applicable and the Disposition within this note     Time User Action Codes Description Comment    2/17/2022 11:44 PM Doylene Gain Add [H53 9] Visual changes     2/18/2022  1:16 AM SudarshanmaryGarry gaona Add [R29 90] Stroke-like symptom     2/18/2022  1:18 AM Doylene Gain Add [I63 9] CVA (cerebral vascular accident) [facility])       ED Disposition     ED Disposition Condition Date/Time Comment    Admit Stable Fri Feb 18, 2022  1:20 AM Case was discussed with Dr Vasyl Lorenz and the patient's admission status was agreed to be Admission Status: inpatient status to the service of Dr Vasyl Lorenz  Follow-up Information     Follow up With Specialties Details Why Contact Info    Ophthalomology  Schedule an appointment as soon as possible for a visit in 1 week(s) Please make an appointment with your choice of Ophthalmology  Recommended appointment within one week from discharge  [address] [place] [address],  Neurology Schedule an appointment as soon as possible for a visit in 6 week(s) Please call and schedule an appointment for 6 weeks after discharge  [facility], [address] [place] [address]      Merlin Styles MD Family Medicine Call in 1 week(s) Please call your PCP to schedule an appointment for follow up after discharge  [address]  [phone]            Patient's Medications    No medications on file       No discharge procedures on file      PDMP Review     None          ED Provider  Electronically Signed by           Shruthi Light DO  02/19/22 725 [facility], DO  02/19/22 [id number]

## 2022-02-18 NOTE — SPEECH THERAPY NOTE
Speech Language/Pathology  Speech/Language Pathology  Assessment    Patient Name: Rony Carter  [id number] Date: 2/18/2022     Problem List  Principal Problem:    Stroke-like symptom  Active Problems:    Essential hypertension    H/O: stroke    Past Medical History  Past Medical History:   Diagnosis Date    Hypertension     Stroke [facility])      Past Surgical History  History reviewed  No pertinent surgical history  Speech-Language Pathology Bedside Swallow Evaluation    Summary   Oral stage [facility] for regular textures, suspect pharyngeal phase WFL  Slight throat clear x1 w/ thins via straw, otherwise no overt s/s aspiration during evaluation  Recommend to initiate a regular texture diet w/ thin liquids  Meds whole as tolerated  SLP will sign off at this time, please re-consult as needed  Risk/s for Aspiration: Low     Recommended Diet: regular diet and thin liquids   Recommended Form of Meds: whole with liquid   Aspiration precautions and swallowing strategies: upright posture, only feed when fully alert and small bites/sips  Other Recommendations: Continue frequent oral care, assistance w/ meals due to vision    Current Medical Status    H/O: stroke  Assessment & Plan  · Stroke 2009  · Residual deficit of left eye blindness  · Continue aspirin when able      Essential hypertension  Assessment & Plan  · Labetalol ordered p r n  10 mg IV q 4 hours for blood pressure greater 487 systolic or 90 diastolic status post tPA  · Continue home medications of Norvasc, Catapres and Cozaar      * Stroke-like symptom  Assessment & Plan  68-year-old female who presented on 02/17/2022 for visual deficits  Significant past medical history of hypertension and stroke  She complained of right eye visual changes since 7:00 p m  Lena Wisdom · Neurology and ophthalmology consult upon evaluation in the emergency department  There was not ophthalmalgic source identified for the visual loss    Neurology recommended tPA and was administered  · CT head:  No acute intracranial findings  · CTA head/neck:  Show no hemodynamically significant stenosis, dissection or occlusion of the carotid or vertebral arteries  No intracranial aneurysm  Vessel irregularity throughout the anterior posterior circulation with vessel irregularity involving the right M1 segment and the bilateral posterior cerebral arteries more distal branches  Moderate to severe atherosclerotic disease also seen involving the intracranial portions of the bilateral internal carotid arteries notably the supraclinoid portions  · MRI ordered  · Echo pending  · No needle sticks, anticoagulation or anti-platelet agents for 24 hours  · Neuro checks per ischemic stroke protocol with reperfusion therapy  · Labetalol 10 mg IV q 4 hours for systolic blood pressure greater than 239 or diastolic blood pressure greater than 90   · PT/OT evaluations ordered  Current Precautions:  Fall    Allergies:  No known food allergies    Past medical history:  Please see H&P for details    Special Studies:  CT Head: No acute abnormality  MRI scheduled but not completed at this time    Social/Education/Vocational Hx:  Pt lives alone but has multiple family members in the area that she reports are willing to assist her  Swallow Information   Current Risks for Dysphagia & Aspiration: CVA  Current Symptoms/Concerns: N/A  Current Diet: regular diet and thin liquids   Baseline Diet: regular diet and thin liquids      Baseline Assessment   Behavior/Cognition: alert  Speech/Language Status: able to participate in basic conversation  Patient Positioning: upright in bed  Pain Status/Interventions/Response to Interventions:   No report of or nonverbal indications of pain         Swallow Mechanism Exam  Facial: symmetrical  Labial: WFL  Lingual: WFL  Velum: symmetrical  Mandible: adequate ROM  Dentition: upper dentures  Vocal quality:clear/adequate   Volitional Cough: strong/productive Consistencies Assessed and Performance   Consistencies Administered: thin liquids, puree, soft solids and hard solids      Oral Stage: WFL  Mastication was adequate with the materials administered today  Bolus formation and transfer were functional with no significant oral residue noted  No overt s/s reduced oral control  Pharyngeal Stage: WFL  Swallow Mechanics:  Swallowing initiation appeared prompt  Laryngeal rise was palpated and judged to be within functional limits  Throat clear x1 w/ initial sip of thin liquids, otherwise no overt s/s aspiration    Esophageal Concerns: none reported      Summary and Recommendations (see above)    Treatment Recommended: None for dysphagia at this time  Speech/Language Assessment    Patient Name: Ava Ruiz 78 y o  Today's Date: 2/18/2022      Problem List  Principal Problem:    Stroke-like symptom  Active Problems:    Essential hypertension    H/O: stroke    Past Medical History  Past Medical History:   Diagnosis Date    Hypertension     Stroke [facility])      Past Surgical History  History reviewed  No pertinent surgical history  CURRENT MEDICAL:  See above    CURRENT COGNITIVE:  Pt reports some baseline forgetfulness prior to hospitalization  Currently, pt able to participate in basic conversation w/ minimal word finding deficits  Pt was not oriented to place or date but was oriented to month and year  Intact for birthday, off by one for age  Modified version of SLUMs completed d/t pt's vision, pt scored a 13/24 which is indicative of a moderate cognitive deficit  Reductions noted in short term memory, generative naming, mental math and auditory comprehension  Pt notes she feels tired and didn't sleep most of the night  CURRENT PAIN & RESPONSE TO INTERVENTIONS:  N/A      AUDITORY COMPREHENSION:  Body part ID: intact  Object/Picture ID: unable to assess d/t vision  Simple yes/no ? 's: intact  Complex y/n ?'s: impaired  One step commands: intact  Two step commands:intact  Complex or 3 step commands: impaired  Paragraph Comprehension: impaired    READING COMPREHENSION:  Unable to assess d/t vision    VERBAL EXPRESSION/EXPR LANGUAGE:  Auto Sequences:   Counting 1-10: intact   Days of Week: intact   Months of Year: intact  Word Repetition:  intact  Phrase Completion:intact  Confrontation Naming: unable to assess d/t vision  Responsive Naming: intact  Divergent Naming: impaired  Picture Description: unable to assess d/t vision  Ability to make needs known:  intact  Conversation: intact    WRITTEN EXPRESSION:  Unable to assess d/t vision    ORAL MOTOR/SPEECH MECHANISM EXAM: intact    MOTOR SPEECH:  intact    Cognitive -linguistic skills:  Appeared to be grossly intact but will need increased assistance d/t visual changes    Oriented to: month, year, KARMEN  Long term memory: intact  Short term memory: impaired for 5 word recall  Immediate memory: intact  Problem solving: impaired  Organizational tasks: DNT  Sequencing: DNT    Further evaluation suggested dependent on family support      Summary/Impression:  Overall, pt presents w/ mild-moderate cognitive impairments however suspect most likely at baseline  Pt will require increased assistance at home d/t vision changes  She reports previously being independent w/ medications/finances       Pauline Liang 85 CCC-SLP  2/18/2022

## 2022-02-18 NOTE — ASSESSMENT & PLAN NOTE
-see details above:  -stroke pathway initiated, status post tPA:  -CT head during alert last night with no acute intracranial pathology, left globe prosthesis noted  -CTA of the head and neck per radiology noted multifocal mild vessel irregularity (right MCA segment, bilateral PCAs), as well as moderate to severe intracranial ICA atherosclerotic disease; no large vessel occlusion/IR target lesion was identified  -awaiting repeat neuroimaging (CT head or MRI brain) at 24 hours post tPA (right around midnight tonight)  -MRI brain pending   -2D echo pending  -holding antiplatelet and anticoagulation status post tPA:  Can initiate antiplatelet and DVT prophylaxis if repeat neuro imaging today is negative for hemorrhagic conversion/transformation  -continuing Lipitor 40 mg daily  -permissive hypertension, but less than 180/105 (as per post tPA protocol)  -hemoglobin A1c of 5 8, lipid panel with LDL of 85  -frequent neuro checks  -telemetry monitoring  -stroke education to be provided  -therapy evaluations (PT/OT/speech therapy)

## 2022-02-18 NOTE — PHYSICAL THERAPY NOTE
PHYSICAL THERAPY EVALUATION  NAME:  Stephany Nelson  DATE: 02/18/22    AGE:   78 y o  Mrn:   [id number]  ADMIT DX:  Decreased vision [H54 7]  CVA (cerebral vascular accident) ([address] ) [I63 9]  Visual changes [H53 9]  Stroke-like symptom [R29 90]    Past Medical History:   Diagnosis Date    Hypertension     Stroke [facility])      Length Of Stay: 0  Performed at least 2 patient identifiers during session: Name and Birthday  PHYSICAL THERAPY EVALUATION :      02/18/22 1131   PT Last Visit   PT Visit Date 02/18/22   Note Type   Note type Evaluation   Pain Assessment   Pain Assessment Tool 0-10   Pain Score No Pain   Restrictions/Precautions   Other Precautions Chair Alarm; Bed Alarm; Fall Risk;Telemetry;Multiple lines;Visual impairment   Home Living   Type of Home House  (1 ANALY)   Home Layout Two level;Performs ADLs on one level; Able to live on main level with bedroom/bathroom  (FF to 2nd floor, stays on 1st floor)   Bathroom Shower/Tub Tub/shower unit   Bathroom Toilet Standard   Bathroom Equipment Shower chair;Grab bars in shower;Commode  (wasn't using shower chair or commode PTA)   Home Equipment Walker;Cane  (wasn't using)   Additional Comments Reports living in a 2 SH with 1 ANALY and 1st floor set up  Reports not using an AD PTA, but has a walker and a cane at home should she need it  Prior Function   Level of [place] Independent with ADLs and functional mobility   Lives With Alone   Receives Help From Family   ADL Assistance Independent   IADLs Independent   Falls in the last 6 months 0   Vocational Retired   Comments Reports being independent with mobility, ADLs and IADLs  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  General   Additional Pertinent History Neurology recommended administering tPA  TPA was administered and completed around 12:43 a m  Spoke with Kacy Bell and isaac to see patient for therapy services  Pt with R eye visual changes at this time and L eye blindness      Cognition Orientation Level Oriented X4   Following Commands Follows one step commands without difficulty   Comments appears to have decreased understanding of deficits, stating "I'll be fine"    Subjective   Subjective "I walk around [facility]"   RLE Assessment   RLE Assessment WFL  (4/5)   LLE Assessment   LLE Assessment WFL  (4/5)   Vision-Basic Assessment   Visual History   (L eye blindness-see mask and hand, but not details (L eye))   Patient Visual Report Blurring of vision when changing focal distance  (difficulty describe R eye visual changes (no details))   Coordination   Movements are Fluid and Coordinated 0   Coordination and Movement Description impaied with al toe tapping   Rapid Alternating Movements Impaired   Light Touch   RLE Light Touch Grossly intact   LLE Light Touch Grossly intact   Bed Mobility   Supine to Sit 6  Modified independent   Additional items Bedrails;HOB elevated   Additional Comments Reports sleeping in recliner chair  modified independent to complete to patient's right  Transfers   Sit to Stand   (steadying to minAx1 (posterior LOB upon standing))   Additional items Increased time required;Verbal cues   Stand to Sit   (steadying assistance)   Additional items Increased time required;Verbal cues   Stand pivot   (steadying assistance)   Additional items Increased time required;Verbal cues   Additional Comments no AD  sit to stand without AD wiht steadying to minAx1 with posterior lean upon standing requiring minAx1 for balance  spt without AD with steadying assistance  stand to sit wiht steadying assistance  wide YOUSUF throughout   Ambulation/Elevation   Gait pattern Wide YOUSUF; Short stride; Excessively slow  (inc path deviation to right, LOB 2x to right)   Gait Assistance   (steadying to minAx1)   Additional items Assist x 1;Verbal cues   Assistive Device None   Distance 40'x1 without AD with steadying to minAx1 with inc path deviation to right with LOB 2x to right  Wide YOUSUF   pt reaching for external support at times  Balance   Static Sitting Good   Dynamic Sitting Fair +   Static Standing Fair -   Dynamic Standing Poor +   Ambulatory Poor +   Activity Tolerance   Activity Tolerance Patient limited by fatigue   Medical Staff Made Aware OT, [place]   Nurse Made Aware RN, Lisa   Assessment   Prognosis Good   Problem List Decreased strength;Decreased endurance; Impaired balance;Decreased mobility; Decreased coordination;Decreased safety awareness; Impaired judgement; Impaired vision   Barriers to Discharge Decreased caregiver support   Barriers to Discharge Comments lives alone, impaired vision  pt reports family can assist    Goals   Patient Goals "GO home"   STG Expiration Date 03/04/22   PT Treatment Day 1   Plan   Treatment/Interventions Functional transfer training;LE strengthening/ROM; Elevations; Therapeutic exercise; Endurance training;Patient/family training;Equipment eval/education; Bed mobility;Gait training; Compensatory technique education;Spoke to nursing;Spoke to case management;OT   PT Frequency 3-5x/wk   Recommendation   PT Discharge Recommendation Home with home health rehabilitation   Equipment Recommended   (cane versus RW-pt has both)   AM-PAC Basic Mobility Inpatient   Turning in Bed Without Bedrails 4   Lying on Back to Sitting on Edge of Flat Bed 4   Moving Bed to Chair 3   Standing Up From Chair 3   Walk in Room 3   Climb 3-5 Stairs 3   Basic Mobility Inpatient Raw Score 20   Basic Mobility Standardized Score 43 99   Highest Level Of Mobility   JH-HLM Goal 6: Walk 10 steps or more   JH-HLM Highest Level of Mobility 7: Walk 25 feet or more   JH-HLM Goal Achieved Yes   Additional Treatment Session   Start Time 1147   End Time 1157   Treatment Assessment Pt demonstrated improved gait pattern with use of spc compared to no AD  She was able to ambulate increased distance with decreased assistance with decreased path deviaiton and no LOB  She is limited by decreased vision, balance, endurance  She will continue to benefit from PT services to maximize LOF  Equipment Use initiated use of spc after education reagrding balance deficits and need for assistance  pt was reluctant to trial spc, but then agreeable after education  sit<>stand with stand by assistance  spt with spc with stand by assistance  ambulated 110'x1 with spc with stand by assistance with slow shantelle, decreased step length  discussed reocmmendation for use of spc  pt verbalized understanding  End of Consult   Patient Position at End of Consult Bedside chair; All needs within reach;Bed/Chair alarm activated     (Please find full objective findings from PT assessment regarding body systems outlined above)  Assessment: Pt is a 78 y o  female seen for PT evaluation s/p admission to [address] [place] [address] on 2/17/2022 with Stroke-like symptom  Order placed for PT services  Upon evaluation: Pt is presenting with impaired functional mobility due to decreased strength, decreased endurance, impaired balance, impaired coordination, gait deviations, decreased safety awareness, impaired judgment, visual impairment and fall risk requiring steadying to min assistance for transfers and steadying to min assistance for ambulation with out AD  Pt's clinical presentation is currently unstable/unpredictable given the functional mobility deficits above, especially weakness, decreased endurance, impaired coordination, gait deviations, decreased activity tolerance, decreased functional mobility tolerance, decreased safety awareness and impaired judgement, coupled with fall risks as indicated by AM-PAC 6-Clicks: 65/30 as well as impaired balance, polypharmacy, impaired coordination, impaired judgement, decreased safety awareness and altered vision and combined with medical complications of abnormal blood sugars, need for input for mobility technique/safety and LOB wiht ambulation    Pt's PMHx and comorbidities that may affect physical performance and progress include: HTN, CVA and limited vision  Personal factors affecting pt at time of IE include: step(s) to enter environment, limited home support, advanced age, inability to perform IADLs, inability to perform ADLs, inability to navigate level surfaces without external assistance and limited insight into impairments  Pt will benefit from continued skilled PT services to address deficits as defined above and to maximize level of functional mobility to facilitate return toward PLOF and improved QOL  From PT/mobility standpoint, recommendation at time of d/c would be Home PT, home with family support and with cane pending progress in order to reduce fall risk and maximize pt's functional independence and consistency with mobility in order to facilitate return to PLOF  Recommend trial with walker next 1-2 sessions, trial with cane next 1-2 sessions and ther ex next 1-2 sessions  The patient's AM-PAC Basic Mobility Inpatient Short Form Raw Score is 20  A Raw score of greater than 16 suggests the patient may benefit from discharge to home  Please also refer to the recommendation of the Physical Therapist for safe discharge planning  Goals: Pt will: Perform bed mobility tasks with independent to reposition in bed and prepare for transfers  Pt will perform transfers with modified I to decrease burden of care, decrease risk for falls and improve activity tolerance and prepare for ambulation  Pt will ambulate with LRAD for >/= 200' with  modified I  to decrease burden of care, decrease risk for falls, improve activity tolerance and improve gait quality and to access home environment  Pt will complete >/= 1 step with LRAD with modified I to return to home with ANALY, decrease risk for falls and improve activity tolerance  Pt will participate in objective balance assessment to determine baseline fall risk  Pt will increase B LE strength >/= 1/2 MMT grade to facilitate functional mobility        Donald Cortez PT,DPT

## 2022-02-18 NOTE — NURSING NOTE
Patient given What You Need to Know About Stroke booklet and instructed to alert staff to any questions or concerns

## 2022-02-18 NOTE — QUICK NOTE
Stroke alert activated 8727  Neuro response immed  LKW 2015  NIHSS 2  Pt w hx HTN, prior ? MARIE L  Eye, presents w sudden painless viz loss OD  Normal oc pressures  /91   No other neuro deficits  CTH- no acute changes  L globe hyperdense  CTA- No LVO  Diminutive R VA  TPA? Although not officially indicated for CRAO, presentation most indicative for same   Advised to check w ophtho to discuss any alternate dx but if not then provided BP is lowered to typical tpa threshold( < 185/110), can offer/give tpa( 4 5h window till 1245)  as without treatment the chances for visual recovery are poor

## 2022-02-18 NOTE — TELEMEDICINE
TeleConsultation/ [facility]PC - Neurology   Anthony Joshi 78 y o  female MRN: [id number]  Unit/Bed#: -01 Encounter: [id number]               Assessment/Plan   78year old female with history of prior stroke/retinal artery occlusion (with residual L eye vision loss per notes), HTN, presenting to [address] [place] [address] on 2/17 with painless R eye vision loss  Concern for right central retinal artery occlusion; was deemed candidate for tPA administration last night  Undergoing workup for evidence of other embolic cerebrovascular event/phenomenon  Normal ESR and sed rate effectively excludes giant cell arteritis/arteritic MARIE  MRI brain as requested  This will help to exclude cortical stroke  Per prior examiners, no identified hemifield deficit, rather global decrease in acuity  Suggest ongoing bedside evaluation to assess this is strictly field cut versus total visual loss  Assess her acuity be it light perception, hand motion, finger counting, or with use Snellen chart  Presuming CRAO, then diagnostic concerns would include possible small vessel insight to thrombosis, atheroembolic from intracranial carotid disease, versus cardioembolic event  Echocardiogram did not disclose any obvious cardioembolic source  Continue on telemetry to exclude PAF    Presuming MRI negative, and no emerging evidence for PAF, then should be treated with dual antiplatelet therapy for 3 months, then Plavix monotherapy  Lipitor 40 mg as stated below      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  Assessment & Plan  -see details above:  -stroke pathway initiated, status post tPA:  -CT head during alert last night with no acute intracranial pathology, left globe prosthesis noted  -CTA of the head and neck per radiology noted multifocal mild vessel irregularity (right MCA segment, bilateral PCAs), as well as moderate to severe intracranial ICA atherosclerotic disease; no large vessel occlusion/IR target lesion was identified  -awaiting repeat neuroimaging (CT head or MRI brain) at 24 hours post tPA (right around midnight tonight)  -MRI brain pending   -2D echo pending  -holding antiplatelet and anticoagulation status post tPA:  Can initiate antiplatelet and DVT prophylaxis if repeat neuro imaging today is negative for hemorrhagic conversion/transformation  -continuing Lipitor 40 mg daily  -permissive hypertension, but less than 180/105 (as per post tPA protocol)  -hemoglobin A1c of 5 8, lipid panel with LDL of 85  -frequent neuro checks  -telemetry monitoring  -stroke education to be provided  -therapy evaluations (PT/OT/speech therapy)        Tyshawn Erickson will need follow up in in 6 weeks with neurovascular attending or advance practitioner  She will not require outpatient neurological testing   Also suggest outpatient ophthalmologic evaluation and follow-up  History of Present Illness     Reason for Consult / Principal Problem: CRAO concern, status post tPA; sudden R eye vision disturbance    HPI: Tyshawn Erickson is a 78 y o  female with history as mentioned above in assessment who neurology is asked to evaluate following stroke alert overnight for painless right eye vision disturbance  No other focal neurologic deficits were appreciated/evident at symptom onset nor throughout last night  Stroke alert was activated overnight, see quick note by Dr Tia Doran for details  Patient was deemed a tPA candidate once BP lowered below tPA threshold of 185/110  Admitted to critical care for post tPA/stroke management and workup  Critical care H&P documented residual left eye blindness from prior stroke/MARIE  Home med list does include aspirin 81 mg daily and multiple antihypertensives  Consult to Neurology  Consult performed by: Jimi Lund PA-C  Consult ordered by:  Jaqui Morris DO           Review of Systems    Historical Information   Past Medical History:   Diagnosis Date    Hypertension     Stroke [facility])      History reviewed  No pertinent surgical history  Social History   Social History     Substance and Sexual Activity   Alcohol Use Never     Social History     Substance and Sexual Activity   Drug Use Never     E-Cigarette/Vaping    E-Cigarette Use Never User      E-Cigarette/Vaping Substances     Social History     Tobacco Use   Smoking Status Never Smoker   Smokeless Tobacco Never Used     Family History: History reviewed  No pertinent family history  Review of previous medical records was completed  Meds/Allergies   current meds:   Current Facility-Administered Medications   Medication Dose Route Frequency    amLODIPine (NORVASC) tablet 5 mg  5 mg Oral Daily    atorvastatin (LIPITOR) tablet 40 mg  40 mg Oral QPM    [START ON 2022] cloNIDine (CATAPRES-TTS-3) 0 3 mg/24 hr TD weekly patch  1 patch Transdermal Weekly    hydrALAZINE (APRESOLINE) injection 5 mg  5 mg Intravenous Q4H PRN    Labetalol HCl (NORMODYNE) injection 10 mg  10 mg Intravenous Q4H PRN    losartan (COZAAR) tablet 50 mg  50 mg Oral Daily    triamcinolone (KENALOG) 0 5 % cream   Topical BID    and PTA meds:   Prior to Admission Medications   Prescriptions Last Dose Informant Patient Reported? Taking? amLODIPine (NORVASC) 5 mg tablet 2022 at Unknown time  Yes Yes   Sig: Take 5 mg by mouth daily   aspirin (ECOTRIN LOW STRENGTH) 81 mg EC tablet 2022 at Unknown time  Yes Yes   Sig: Take 81 mg by mouth daily   cloNIDine (CATAPRES-TTS-3) 0 3 mg/24 hr 2/15/2022 at Unknown time  Yes Yes   Si 3 patches   losartan (COZAAR) 50 mg tablet 2022 at 1800  Yes Yes   Sig: Take 50 mg by mouth daily      Facility-Administered Medications: None       No Known Allergies    Objective   Vitals:Blood pressure 142/65, pulse 92, temperature 97 5 °F (36 4 °C), temperature source Temporal, resp  rate 15, height 5' 4" (1 626 m), weight 75 3 kg (166 lb), SpO2 99 %  ,Body mass index is 28 49 kg/m²      Intake/Output Summary (Last 24 hours) at 2022 [address] [facility] Bliss filed at 2/18/2022 0901  Gross per 24 hour   Intake 573 4 ml   Output 1675 ml   Net -1101 6 ml       Invasive Devices: Invasive Devices  Report    Peripheral Intravenous Line            Peripheral IV 02/17/22 Left Antecubital <1 day    Peripheral IV 02/18/22 Right Antecubital <1 day          Drain            External Urinary Catheter <1 day                Physical Exam  Neurologic Exam    Lab Results:   CBC:   Results from last 7 days   Lab Units 02/18/22  0011   WBC Thousand/uL 6 66   RBC Million/uL 3 88   HEMOGLOBIN g/dL 11 5   HEMATOCRIT % 35 6   MCV fL 92   PLATELETS Thousands/uL 218   , BMP/CMP:   Results from last 7 days   Lab Units 02/18/22  0011   SODIUM mmol/L 138   POTASSIUM mmol/L 3 8   CHLORIDE mmol/L 104   CO2 mmol/L 28   BUN mg/dL 21   CREATININE mg/dL 0 93   CALCIUM mg/dL 8 2*   EGFR ml/min/1 73sq m 57   , Vitamin B12:   , HgBA1C:   Results from last 7 days   Lab Units 02/18/22  0011   HEMOGLOBIN A1C % 5 8*   , TSH:   , Coagulation:   Results from last 7 days   Lab Units 02/18/22  0011   INR  1 03   , Lipid Profile:   Results from last 7 days   Lab Units 02/18/22  0011   HDL mg/dL 49*   LDL CALC mg/dL 85   TRIGLYCERIDES mg/dL 111     Imaging Studies: I have personally reviewed pertinent films in PACS   CTA stroke alert (head/neck)   Final Result by Xiao Elder MD (02/18 0030)      No hemodynamically significant stenosis, dissection or occlusion of the carotid or vertebral arteries  No intracranial aneurysm  Vessel irregularity throughout the anterior and posterior circulation with vessel irregularity involving the right M1 segment and the bilateral posterior cerebral arteries and more distal branches  Moderate to severe    atherosclerotic disease also seen involving the intracranial portions of the bilateral internal carotid arteries notably the supraclinoid portions  Findings were directly discussed with Dr Elise Novoa at 12:20 AM, 2/18/2022  Workstation performed: [id number]         CT stroke alert brain   Final Result by Dorothy Phelan MD (02/18 0030)      No acute intracranial abnormality  Findings were directly discussed with Dr Dacia Woodruff at 12:20 AM, 2/18/2022  Workstation performed: [id number]         MRI Inpatient Order    (Results Pending)   CT head wo contrast    (Results Pending)       EKG, Pathology, and Other Studies: I have personally reviewed pertinent reports        VTE Prophylaxis: Sequential compression device (Venodyne)  and Reason for no pharmacologic prophylaxis tPA given    Code Status: Level 1 - Full Code

## 2022-02-18 NOTE — ASSESSMENT & PLAN NOTE
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  · Neurology and ophthalmology consult upon evaluation in the emergency department  There was not ophthalmalgic source identified for the visual loss  Neurology recommended tPA and was administered  · CT head:  No acute intracranial findings  · CTA head/neck:  Show no hemodynamically significant stenosis, dissection or occlusion of the carotid or vertebral arteries  No intracranial aneurysm  Vessel irregularity throughout the anterior posterior circulation with vessel irregularity involving the right M1 segment and the bilateral posterior cerebral arteries more distal branches  Moderate to severe atherosclerotic disease also seen involving the intracranial portions of the bilateral internal carotid arteries notably the supraclinoid portions  · Echo: EF 65%, LV normal size and function, RV normal size and function, Trace MV regurg, Mild TV regurg  · MRI - No evidence of acute infarct, intracranial hemorrhage or mass  · Repeat CT head at 24 hours ordered - pending  · No needle sticks, anticoagulation or anti-platelet agents for 24 hours  · Neuro checks per ischemic stroke protocol with reperfusion therapy  · PT/OT/ST evaluations  · Inpt ophthalmology not available at this campus  Okay to follow up outpatient  · Neurology on consult  · A1C 5 8, Lipid panel checked - LDL 85  · Per Neurology - can initiate antiplatelet and DVT prophylaxis if repeat neuro imaging today is negative for hemorrhagic conversion/transformation  · Cont Lipitor 40 mg daily   · Labetalol 10 mg IV q 4 hours for systolic blood pressure greater than 174 or diastolic blood pressure greater than 90  · Stroke education to be provided  · 2/18:  Repeat CT head:  No acute intracranial abnormalities  · Aspirin 81 mg scheduled to be started today along with Lovenox for DVT prophylaxis  · Recommend follow-up with outpatient Ophthalmology as cause of visual disturbance likely not to be associated with stroke given MRI negative for acute infarct  Avani Vigil

## 2022-02-18 NOTE — UTILIZATION REVIEW
Inpatient Admission Authorization Request   NOTIFICATION OF INPATIENT ADMISSION/INPATIENT AUTHORIZATION REQUEST   SERVICING FACILITY:   [address] [place] [address]  Caden Smith [facility], [address]  Tax ID: [id number]  NPI: [id number]  Place of [facility]patient [address] [facility]  [address]  Place of Service Code: 24     ATTENDING PROVIDER:  Attending Name and NPI#: Basilmavis Edson [[id number]]  Address: [facility]  Caden Smith  [facility], [facility] [address]  Phone: [phone]     UTILIZATION REVIEW CONTACT:  Greer Saenz, Utilization   Network Utilization Review Department  Phone: [phone]  Fax [phone]  Email: [email or web address]     PHYSICIAN ADVISORY SERVICES:  FOR SEN[facility]-[place]-RN[facility] REVIEW - MEDICAL NECESSITY DENIAL  Phone: [phone]  Fax: [phone]  Email: [email or web address]     TYPE OF REQUEST:  Inpatient Status     ADMISSION INFORMATION:  ADMISSION DATE/TIME: 2/18/22  1:08 AM  PATIENT DIAGNOSIS CODE/DESCRIPTION:  Decreased vision [H54 7]  CVA (cerebral vascular accident) ([facility] [address] ) [I63 9]  Visual changes [H53 9]  Stroke-like symptom [R29 90]  DISCHARGE DATE/TIME: No discharge date for patient encounter  DISCHARGE DISPOSITION (IF DISCHARGED): Final discharge disposition not confirmed     IMPORTANT INFORMATION:  Please contact the Greer Saenz directly with any questions or concerns regarding this request  Department voicemails are confidential     Send requests for admission clinical reviews, concurrent reviews, approvals, and administrative denials due to lack of clinical to fax [phone]

## 2022-02-18 NOTE — ASSESSMENT & PLAN NOTE
· Labetalol ordered p r n  10 mg IV q 4 hours for blood pressure greater 239 systolic or 90 diastolic status post tPA  · Continue home medications of Norvasc, Catapres and Cozaar

## 2022-02-18 NOTE — PLAN OF CARE
Problem: PHYSICAL THERAPY ADULT  Goal: Performs mobility at highest level of function for planned discharge setting  See evaluation for individualized goals  Description: Treatment/Interventions: Functional transfer training,LE strengthening/ROM,Elevations,Therapeutic exercise,Endurance training,Patient/family training,Equipment eval/education,Bed mobility,Gait training,Compensatory technique education,Spoke to nursing,Spoke to case management,OT  Equipment Recommended:  (cane versus RW-pt has both)       See flowsheet documentation for full assessment, interventions and recommendations  [id number] by Jorje Merritt PT  Note: Prognosis: Good  Problem List: Decreased strength,Decreased endurance,Impaired balance,Decreased mobility,Decreased coordination,Decreased safety awareness,Impaired judgement,Impaired vision  Pt demonstrated improved gait pattern with use of spc compared to no AD  She was able to ambulate increased distance with decreased assistance with decreased path deviaiton and no LOB  She is limited by decreased vision, balance, endurance  She will continue to benefit from PT services to maximize LOF  Barriers to Discharge: Decreased caregiver support  Barriers to Discharge Comments: lives alone, impaired vision  pt reports family can assist      PT Discharge Recommendation: Home with home health rehabilitation          See flowsheet documentation for full assessment

## 2022-02-18 NOTE — PLAN OF CARE
Problem: MOBILITY - ADULT  Goal: Maintain or return to baseline ADL function  Description: INTERVENTIONS:  -  Assess patient's ability to carry out ADLs; assess patient's baseline for ADL function and identify physical deficits which impact ability to perform ADLs (bathing, care of mouth/teeth, toileting, grooming, dressing, etc )  - Assess/evaluate cause of self-care deficits   - Assess range of motion  - Assess patient's mobility; develop plan if impaired  - Assess patient's need for assistive devices and provide as appropriate  - Encourage maximum independence but intervene and supervise when necessary  - Involve family in performance of ADLs  - Assess for home care needs following discharge   - Consider OT consult to assist with ADL evaluation and planning for discharge  - Provide patient education as appropriate  Outcome: Progressing  Goal: Maintains/Returns to pre admission functional level  Description: INTERVENTIONS:  - Perform BMAT or MOVE assessment daily    - Set and communicate daily mobility goal to care team and patient/family/caregiver  - Collaborate with rehabilitation services on mobility goals if consulted  - Perform Range of Motion 3 times a day  - Reposition patient every 2 hours    - Dangle patient 3 times a day  - Stand patient 3 times a day  - Ambulate patient 3 times a day  - Out of bed to chair 3 times a day   - Out of bed for meals 3 times a day  - Out of bed for toileting  - Record patient progress and toleration of activity level   Outcome: Progressing     Problem: Potential for Falls  Goal: Patient will remain free of falls  Description: INTERVENTIONS:  - Educate patient/family on patient safety including physical limitations  - Instruct patient to call for assistance with activity   - Consult OT/PT to assist with strengthening/mobility   - Keep Call bell within reach  - Keep bed low and locked with side rails adjusted as appropriate  - Keep care items and personal belongings within reach  - Initiate and maintain comfort rounds  - Make Fall Risk Sign visible to staff  - Offer Toileting every 2 Hours, in advance of need  - Initiate/Maintain bed alarm  - Obtain necessary fall risk management equipment: call bell/personal items in reach, bed alarm activated  - Apply yellow socks and bracelet for high fall risk patients  - Consider moving patient to room near nurses station  Outcome: Progressing     Problem: PAIN - ADULT  Goal: Verbalizes/displays adequate comfort level or baseline comfort level  Description: Interventions:  - Encourage patient to monitor pain and request assistance  - Assess pain using appropriate pain scale  - Administer analgesics based on type and severity of pain and evaluate response  - Implement non-pharmacological measures as appropriate and evaluate response  - Consider cultural and social influences on pain and pain management  - Notify physician/advanced practitioner if interventions unsuccessful or patient reports new pain  Outcome: Progressing     Problem: INFECTION - ADULT  Goal: Absence or prevention of progression during hospitalization  Description: INTERVENTIONS:  - Assess and monitor for signs and symptoms of infection  - Monitor lab/diagnostic results  - Monitor all insertion sites, i e  indwelling lines, tubes, and drains  - Monitor endotracheal if appropriate and nasal secretions for changes in amount and color  - [place] appropriate cooling/warming therapies per order  - Administer medications as ordered  - Instruct and encourage patient and family to use good hand hygiene technique  - Identify and instruct in appropriate isolation precautions for identified infection/condition  Outcome: Progressing  Goal: Absence of fever/infection during neutropenic period  Description: INTERVENTIONS:  - Monitor WBC    Outcome: Progressing     Problem: SAFETY ADULT  Goal: Maintain or return to baseline ADL function  Description: INTERVENTIONS:  -  Assess patient's ability to carry out ADLs; assess patient's baseline for ADL function and identify physical deficits which impact ability to perform ADLs (bathing, care of mouth/teeth, toileting, grooming, dressing, etc )  - Assess/evaluate cause of self-care deficits   - Assess range of motion  - Assess patient's mobility; develop plan if impaired  - Assess patient's need for assistive devices and provide as appropriate  - Encourage maximum independence but intervene and supervise when necessary  - Involve family in performance of ADLs  - Assess for home care needs following discharge   - Consider OT consult to assist with ADL evaluation and planning for discharge  - Provide patient education as appropriate  Outcome: Progressing  Goal: Maintains/Returns to pre admission functional level  Description: INTERVENTIONS:  - Perform BMAT or MOVE assessment daily    - Set and communicate daily mobility goal to care team and patient/family/caregiver  - Collaborate with rehabilitation services on mobility goals if consulted  - Perform Range of Motion 3 times a day  - Reposition patient every 2 hours    - Dangle patient 3 times a day  - Stand patient 3 times a day  - Ambulate patient 3 times a day  - Out of bed to chair 3 times a day   - Out of bed for meals 3 times a day  - Out of bed for toileting  - Record patient progress and toleration of activity level   Outcome: Progressing  Goal: Patient will remain free of falls  Description: INTERVENTIONS:  - Educate patient/family on patient safety including physical limitations  - Instruct patient to call for assistance with activity   - Consult OT/PT to assist with strengthening/mobility   - Keep Call bell within reach  - Keep bed low and locked with side rails adjusted as appropriate  - Keep care items and personal belongings within reach  - Initiate and maintain comfort rounds  - Make Fall Risk Sign visible to staff  - Offer Toileting every 2 Hours, in advance of need  - Initiate/Maintain bed alarm  - Obtain necessary fall risk management equipment: call bell/personal items in reach, bed alarm  - Apply yellow socks and bracelet for high fall risk patients  - Consider moving patient to room near nurses station  Outcome: Progressing     Problem: DISCHARGE PLANNING  Goal: Discharge to home or other facility with appropriate resources  Description: INTERVENTIONS:  - Identify barriers to discharge w/patient and caregiver  - Arrange for needed discharge resources and transportation as appropriate  - Identify discharge learning needs (meds, wound care, etc )  - Arrange for interpretive services to assist at discharge as needed  - Refer to Case Management Department for coordinating discharge planning if the patient needs post-hospital services based on physician/advanced practitioner order or complex needs related to functional status, cognitive ability, or social support system  Outcome: Progressing     Problem: Knowledge Deficit  Goal: Patient/family/caregiver demonstrates understanding of disease process, treatment plan, medications, and discharge instructions  Description: Complete learning assessment and assess knowledge base  Interventions:  - Provide teaching at level of understanding  - Provide teaching via preferred learning methods  Outcome: Progressing     Problem: Neurological Deficit  Goal: Neurological status is stable or improving  Description: Interventions:  - Monitor and assess patient's level of consciousness, motor function, sensory function, and level of assistance needed for ADLs  - Monitor and report changes from baseline  Collaborate with interdisciplinary team to initiate plan and implement interventions as ordered  - Provide and maintain a safe environment  - Consider seizure precautions  - Consider fall precautions  - Consider aspiration precautions  - Consider bleeding precautions  Outcome: Progressing     Problem:  Activity Intolerance/Impaired Mobility  Goal: Mobility/activity is maintained at optimum level for patient  Description: Interventions:  - Assess and monitor patient  barriers to mobility and need for assistive/adaptive devices  - Assess patient's emotional response to limitations  - Collaborate with interdisciplinary team and initiate plans and interventions as ordered  - Encourage independent activity per ability   - Maintain proper body alignment  - Perform active/passive rom as tolerated/ordered  - Plan activities to conserve energy   - Turn patient as appropriate  Outcome: Progressing     Problem: Potential for Aspiration  Goal: Non-ventilated patient's risk of aspiration is minimized  Description: Assess and monitor vital signs, respiratory status, and labs (WBC)  Monitor for signs of aspiration (tachypnea, cough, rales, wheezing, cyanosis, fever)  - Assess and monitor patient's ability to swallow  - Place patient up in chair to eat if possible  - HOB up at 90 degrees to eat if unable to get patient up into chair   - Supervise patient during oral intake  - Instruct patient/ family to take small bites  - Instruct patient/ family to take small single sips when taking liquids  - Follow patient-specific strategies generated by speech pathologist   Outcome: Progressing  Goal: Ventilated patient's risk of aspiration is minimized  Description: Assess and monitor vital signs, respiratory status, airway cuff pressure, and labs (WBC)  Monitor for signs of aspiration (tachypnea, cough, rales, wheezing, cyanosis, fever)  - Elevate head of bed 30 degrees if patient has tube feeding   - Monitor tube feeding  Outcome: Progressing     Problem: Communication Impairment  Goal: Ability to express needs and understand communication  Description: Assess patient's communication skills and ability to understand information    Patient will demonstrate use of effective communication techniques, alternative methods of communication and understanding even if not able to speak  - Encourage communication and provide alternate methods of communication as needed  - Collaborate with case management/ for discharge needs  - Include patient/family/caregiver in decisions related to communication  Outcome: Progressing     Problem: Nutrition  Goal: Nutrition/Hydration status is improving  Description: Monitor and assess patient's nutrition/hydration status for malnutrition (ex- brittle hair, bruises, dry skin, pale skin and conjunctiva, muscle wasting, smooth red tongue, and disorientation)  Collaborate with interdisciplinary team and initiate plan and interventions as ordered  Monitor patient's weight and dietary intake as ordered or per policy  Utilize nutrition screening tool and intervene per policy  Determine patient's food preferences and provide high-protein, high-caloric foods as appropriate  - Assist patient with eating   - Allow adequate time for meals   - Encourage patient to take dietary supplement as ordered  - Collaborate with clinical nutritionist   - Include patient/family/caregiver in decisions related to nutrition    Outcome: Progressing

## 2022-02-18 NOTE — CASE MANAGEMENT
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    Risk of Unplanned Readmission Score: 9         Current admission status: Inpatient  Referral Reason:  (dc planning)    Preferred Pharmacy:   [address]  [address], [address] [facility] [place], [address] [id number]  Phone: [phone] Fax: [phone]    Primary Care Provider: Garima Acevedo MD    Primary Insurance: [facility]  Secondary Insurance:     CM met with patient at the bedside,baseline information  was obtained  CM discussed the role of CM in helping the patient develop a discharge plan and assist the patient in carry out their plan  Present with assessment were 2 granddaughters  No POA said all 3 children would be her decision maker   + Advance Directive at home per patient " I keep changing it"    ASSESSMENT:  [place] [address] Agents    There are no active Health Care Agents on file         Advance Directives  Does patient have a Health Care POA?: No  Was patient offered paperwork?: Yes  Does patient currently have a Health Care decision maker?: Yes, please see Health Care Proxy section  Does patient have Advance Directives?: No  Was patient offered paperwork?: Yes (decline)  Primary Contact: April         Readmission Root Cause  30 Day Readmission: No    Patient Information  Admitted from[de-identified] Home  Mental Status: Alert  During Assessment patient was accompanied by: Not accompanied during assessment  Assessment information provided by[de-identified] Patient  Support Systems: [address] of Residence: [address] do you live in?: [place]  Type of Current Residence:  (1st floor set up)  In the last 12 months, was there a time when you were not able to pay the mortgage or rent on time?: No  In the last 12 months, how many places have you lived?: 1  In the last 12 months, was there a time when you did not have a steady place to sleep or slept in a shelter (including now)?: No  Living Arrangements: Lives Alone  Is patient a ?: No    Activities of Daily Living Prior to Admission  Functional Status: Independent  Completes ADLs independently?: No  Ambulates independently?: Yes  Does patient use assisted devices?: No  Does patient currently own DME?: No  Does patient have a history of Outpatient Therapy (PT/OT)?: No  Does the patient have a history of Short-Term Rehab?: No  Does patient have a history of HHC?: No         Patient Information Continued  Income Source: Pension/[facility]  Does patient have prescription coverage?: No  Within the past 12 months, you worried that your food would run out before you got the money to buy more : Never true  Within the past 12 months, the food you bought just didnt last and you didnt have money to get more : Never true  Food insecurity resource given?: No  Does patient receive dialysis treatments?: No  Does patient have a history of substance abuse?: No  Does patient have a history of Mental Health Diagnosis?: No         Means of Transportation  Means of Transport to Appts[de-identified] Drives Self  In the past 12 months, has lack of transportation kept you from medical appointments or from getting medications?: No  In the past 12 months, has lack of transportation kept you from meetings, work, or from getting things needed for daily living?: No  Was application for public transport provided?: Refused        DISCHARGE DETAILS:    Discharge planning discussed with[de-identified] patient  Freedom of Choice: Yes                        [address]         Is the patient interested in [facility] at discharge?: Yes  [address] requested[de-identified] [address] Name[de-identified]  (pending acceptance)  [facility] Scott Delgado Provider[de-identified] PCP  Home Health Services Needed[de-identified] Other (comment),Evaluate Functional Status and Safety (cardiopulmonary assessment/ med management)  Homebound Criteria Met[de-identified] Requires Medical Transportation  Supporting Clincal Findings[de-identified] Limited Endurance    DME Referral Provided  Referral made for DME?: No                 Discharge Destination Plan[de-identified] Home  Transport at Discharge : Family               discussed in rounds with provider, nursing, therapy  Stroke like symptoms  MRI is pending

## 2022-02-19 VITALS
SYSTOLIC BLOOD PRESSURE: 113 MMHG | WEIGHT: 160.05 LBS | OXYGEN SATURATION: 98 % | HEART RATE: 74 BPM | TEMPERATURE: 98.3 F | RESPIRATION RATE: 32 BRPM | DIASTOLIC BLOOD PRESSURE: 69 MMHG | BODY MASS INDEX: 27.33 KG/M2 | HEIGHT: 64 IN

## 2022-02-19 PROBLEM — Z86.73 H/O: STROKE: Status: RESOLVED | Noted: 2022-02-18 | Resolved: 2022-02-19

## 2022-02-19 LAB
ANION GAP SERPL CALCULATED.3IONS-SCNC: 9 MMOL/L (ref 4–13)
BUN SERPL-MCNC: 14 MG/DL (ref 5–25)
CALCIUM SERPL-MCNC: 8.8 MG/DL (ref 8.3–10.1)
CHLORIDE SERPL-SCNC: 105 MMOL/L (ref 100–108)
CO2 SERPL-SCNC: 27 MMOL/L (ref 21–32)
CREAT SERPL-MCNC: 0.98 MG/DL (ref 0.6–1.3)
ERYTHROCYTE [DISTWIDTH] IN BLOOD BY AUTOMATED COUNT: 13.7 % (ref 11.6–15.1)
GFR SERPL CREATININE-BSD FRML MDRD: 53 ML/MIN/1.73SQ M
GLUCOSE SERPL-MCNC: 133 MG/DL (ref 65–140)
HCT VFR BLD AUTO: 37.5 % (ref 34.8–46.1)
HGB BLD-MCNC: 12.7 G/DL (ref 11.5–15.4)
MAGNESIUM SERPL-MCNC: 2.4 MG/DL (ref 1.6–2.6)
MCH RBC QN AUTO: 30.4 PG (ref 26.8–34.3)
MCHC RBC AUTO-ENTMCNC: 33.9 G/DL (ref 31.4–37.4)
MCV RBC AUTO: 90 FL (ref 82–98)
PLATELET # BLD AUTO: 220 THOUSANDS/UL (ref 149–390)
PMV BLD AUTO: 11.2 FL (ref 8.9–12.7)
POTASSIUM SERPL-SCNC: 3.7 MMOL/L (ref 3.5–5.3)
RBC # BLD AUTO: 4.18 MILLION/UL (ref 3.81–5.12)
SODIUM SERPL-SCNC: 141 MMOL/L (ref 136–145)
WBC # BLD AUTO: 7.56 THOUSAND/UL (ref 4.31–10.16)

## 2022-02-19 PROCEDURE — NC001 PR NO CHARGE: Performed by: PHYSICIAN ASSISTANT

## 2022-02-19 PROCEDURE — 85027 COMPLETE CBC AUTOMATED: CPT | Performed by: NURSE PRACTITIONER

## 2022-02-19 PROCEDURE — 99238 HOSP IP/OBS DSCHRG MGMT 30/<: CPT | Performed by: ANESTHESIOLOGY

## 2022-02-19 PROCEDURE — 80048 BASIC METABOLIC PNL TOTAL CA: CPT | Performed by: NURSE PRACTITIONER

## 2022-02-19 PROCEDURE — NC001 PR NO CHARGE: Performed by: NURSE PRACTITIONER

## 2022-02-19 PROCEDURE — 83735 ASSAY OF MAGNESIUM: CPT | Performed by: NURSE PRACTITIONER

## 2022-02-19 RX ORDER — ATORVASTATIN CALCIUM 40 MG/1
40 TABLET, FILM COATED ORAL EVERY EVENING
Qty: 30 TABLET | Refills: 0 | Status: SHIPPED | OUTPATIENT
Start: 2022-02-19

## 2022-02-19 RX ORDER — CLOPIDOGREL BISULFATE 75 MG/1
75 TABLET ORAL DAILY
Qty: 30 TABLET | Refills: 0 | Status: SHIPPED | OUTPATIENT
Start: 2022-02-20

## 2022-02-19 RX ORDER — ATORVASTATIN CALCIUM 40 MG/1
40 TABLET, FILM COATED ORAL EVERY EVENING
Qty: 30 TABLET | Refills: 0 | Status: CANCELLED | OUTPATIENT
Start: 2022-02-19

## 2022-02-19 RX ORDER — CLOPIDOGREL BISULFATE 75 MG/1
75 TABLET ORAL DAILY
Status: DISCONTINUED | OUTPATIENT
Start: 2022-02-19 | End: 2022-02-19 | Stop reason: HOSPADM

## 2022-02-19 RX ADMIN — CLOPIDOGREL BISULFATE 75 MG: 75 TABLET ORAL at 09:07

## 2022-02-19 RX ADMIN — TRIAMCINOLONE ACETONIDE: 5 CREAM TOPICAL at 09:07

## 2022-02-19 RX ADMIN — LOSARTAN POTASSIUM 50 MG: 50 TABLET, FILM COATED ORAL at 09:07

## 2022-02-19 RX ADMIN — ASPIRIN 81 MG: 81 TABLET, COATED ORAL at 09:07

## 2022-02-19 RX ADMIN — ENOXAPARIN SODIUM 40 MG: 40 INJECTION SUBCUTANEOUS at 09:07

## 2022-02-19 RX ADMIN — AMLODIPINE BESYLATE 5 MG: 5 TABLET ORAL at 09:07

## 2022-02-19 NOTE — PROGRESS NOTES
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  · Neurology and ophthalmology consult upon evaluation in the emergency department  There was not ophthalmalgic source identified for the visual loss  Neurology recommended tPA and was administered  · CT head:  No acute intracranial findings  · CTA head/neck:  Show no hemodynamically significant stenosis, dissection or occlusion of the carotid or vertebral arteries  No intracranial aneurysm  Vessel irregularity throughout the anterior posterior circulation with vessel irregularity involving the right M1 segment and the bilateral posterior cerebral arteries more distal branches  Moderate to severe atherosclerotic disease also seen involving the intracranial portions of the bilateral internal carotid arteries notably the supraclinoid portions  · Echo: EF 65%, LV normal size and function, RV normal size and function, Trace MV regurg, Mild TV regurg  · MRI - No evidence of acute infarct, intracranial hemorrhage or mass  · Repeat CT head at 24 hours ordered - pending  · No needle sticks, anticoagulation or anti-platelet agents for 24 hours  · Neuro checks per ischemic stroke protocol with reperfusion therapy  · PT/OT/ST evaluations  · Inpt ophthalmology not available at this campus  Okay to follow up outpatient    · Neurology on consult  · A1C 5 8, Lipid panel checked - LDL 85  · Per Neurology - can initiate antiplatelet and DVT prophylaxis if repeat neuro imaging today is negative for hemorrhagic conversion/transformation  · Cont Lipitor 40 mg daily   · Labetalol 10 mg IV q 4 hours for systolic blood pressure greater than 011 or diastolic blood pressure greater than 90  · Stroke education to be provided  · 2/18:  Repeat CT head:  No acute intracranial abnormalities  · Aspirin 81 mg scheduled to be started today along with Lovenox for DVT prophylaxis  · Recommend follow-up with outpatient Ophthalmology as cause of visual disturbance likely not to be associated with stroke given MRI negative for acute infarct       H/O: stroke  Assessment & Plan  · Stroke 2009  · Residual deficit of left eye blindness  · Continue aspirin when able  Essential hypertension  Assessment & Plan  · Labetalol ordered p r n  10 mg IV q 4 hours for blood pressure greater 382 systolic or 90 diastolic status post tPA  · Continue home medications of Norvasc, Catapres and Cozaar       ----------------------------------------------------------------------------------------  HPI/24hr events:  Late last evening around 2200, patient became confused to place but was able to be redirected  Repeat CT head imaging was completed early and showed no acute intracranial abnormality  Patient was given melatonin to help her sleep as she did not rest well the night before due to admission and stroke workup  Delirium was considered for the cause of her acute confusion  She rested well for the majority of the night  She did wake up in the early morning and remained confused to why she was in the hospital   She was again able to be redirected  With a repeat CT head showing no acute intracranial abnormalities, aspirin 81 mg was ordered to be restarted this morning as well as DVT prophylaxis of Lovenox  Patient appropriate for transfer out of the ICU today?: Patient does not meet criteria for referral to the ICU Follow-Up Clinic; referral has not been made     Disposition: Transfer to Med Surg with Telemetry   Code Status: Level 1 - Full Code  ---------------------------------------------------------------------------------------    Review of Systems  Review of systems was reviewed and negative unless stated above in HPI/24-hour events   ---------------------------------------------------------------------------------------  OBJECTIVE    Vitals   Vitals:    22 2300 22 0000 22 0100 22 0200   BP: 102/51 116/59  126/58   BP Location:  Left arm     Pulse: 78 78 78 78   Resp: 13 14 14 12   Temp:  98 3 °F (36 8 °C)     TempSrc:  Temporal     SpO2: 97% 96% 97% 96%   Weight:       Height:         Temp (24hrs), Av 8 °F (36 6 °C), Min:97 5 °F (36 4 °C), Max:98 3 °F (36 8 °C)  Current: Temperature: 98 3 °F (36 8 °C)          Respiratory:  SpO2: SpO2: 96 %       Invasive/non-invasive ventilation settings   Respiratory  Report   Lab Data (Last 4 hours)    None         O2/Vent Data (Last 4 hours)    None                Physical Exam  Constitutional:       General: She is not in acute distress  Appearance: She is not ill-appearing or diaphoretic  HENT:      Head: Normocephalic and atraumatic  Right Ear: External ear normal       Left Ear: External ear normal       Nose: Nose normal       Mouth/Throat:      Mouth: Mucous membranes are moist       Pharynx: Oropharynx is clear  Eyes:      General: Visual field deficit (Blurry vision out of right eye  Baseline left eye blindness ) present  No scleral icterus  Right eye: No discharge  Left eye: No discharge  Extraocular Movements: Extraocular movements intact  Conjunctiva/sclera: Conjunctivae normal       Pupils: Pupils are equal, round, and reactive to light  Cardiovascular:      Rate and Rhythm: Normal rate and regular rhythm  Pulses: Normal pulses  Heart sounds: No friction rub  No gallop  Pulmonary:      Effort: Pulmonary effort is normal  No respiratory distress        Breath sounds: Normal breath sounds  No stridor  No wheezing, rhonchi or rales  Chest:      Chest wall: No tenderness  Abdominal:      General: Bowel sounds are normal  There is no distension  Palpations: Abdomen is soft  There is no mass  Tenderness: There is no abdominal tenderness  There is no guarding or rebound  Musculoskeletal:         General: No swelling, tenderness, deformity or signs of injury  Normal range of motion  Cervical back: Normal range of motion and neck supple  No rigidity or tenderness  Right lower leg: No edema  Left lower leg: No edema  Skin:     General: Skin is warm and dry  Coloration: Skin is not jaundiced or pale  Findings: No rash  Neurological:      General: No focal deficit present  Mental Status: She is alert  She is disoriented  GCS: GCS eye subscore is 4  GCS verbal subscore is 5  GCS motor subscore is 6  Cranial Nerves: No dysarthria or facial asymmetry  Sensory: No sensory deficit  Motor: No weakness  Comments: Disoriented but redirectable  Once redirected, she is able to state her name, place and month/year  Laboratory and Diagnostics:  Results from last 7 days   Lab Units 02/18/22  0011   WBC Thousand/uL 6 66   HEMOGLOBIN g/dL 11 5   HEMATOCRIT % 35 6   PLATELETS Thousands/uL 218     Results from last 7 days   Lab Units 02/18/22  0011   SODIUM mmol/L 138   POTASSIUM mmol/L 3 8   CHLORIDE mmol/L 104   CO2 mmol/L 28   ANION GAP mmol/L 6   BUN mg/dL 21   CREATININE mg/dL 0 93   CALCIUM mg/dL 8 2*   GLUCOSE RANDOM mg/dL 143*          Results from last 7 days   Lab Units 02/18/22  0011   INR  1 03   PTT seconds 27              ABG:    VBG:          Micro        EKG: NSR on telemetry  Imaging: I have personally reviewed pertinent reports  and I have personally reviewed pertinent films in PACS    Intake and Output  I/O       02/17 0701 02/18 0700 02/18 0701 02/19 0700    P  O  100 960    IV Piggyback 63 4 50    Total Intake(mL/kg) 163 4 (2 2) 1010 (13 4)    Urine (mL/kg/hr) 875 1350 (0 7)    Stool  0    Total Output 875 1350    Net -711 6 -340          Unmeasured Urine Occurrence  4 x    Unmeasured Stool Occurrence  1 x          Height and Weights   Height: 5' 4" (162 6 cm)     Body mass index is 28 49 kg/m²  Weight (last 2 days)     Date/Time Weight    02/18/22 0800 75 3 (166)    02/18/22 0600 75 6 (166 67)    02/18/22 0300 75 6 (166 67)    02/17/22 2328 78 3 (172 62)            Nutrition       Diet Orders   (From admission, onward)             Start     Ordered    02/18/22 0852  Diet Regular; Regular House; Sodium 2 GM  Diet effective now        References:    Nutrtion Support Algorithm Enteral vs  Parenteral   Question Answer Comment   Diet Type Regular    Regular Regular House    Other Restriction(s): Sodium 2 GM    RD to adjust diet per protocol?  Yes        02/18/22 0106                  Active Medications  Scheduled Meds:  Current Facility-Administered Medications   Medication Dose Route Frequency Provider Last Rate    amLODIPine  5 mg Oral Daily Geovanny Egan Jr, PA-C      aspirin  81 mg Oral Daily Geovanny Malik PA-C      atorvastatin  40 mg Oral QPM Geovanny Egan Jr, PA-C      [START ON 2/21/2022] cloNIDine  1 patch Transdermal Weekly Geovanny Egan Jr, PA-C      enoxaparin  40 mg Subcutaneous Q24H [address] Geovannycassandra Egan Jr, PA-C      hydrALAZINE  5 mg Intravenous Q4H PRN Geovanny Egan Jr, PA-C      Labetalol HCl  10 mg Intravenous Q4H PRN Geovanny Egan Jr, PA-C      losartan  50 mg Oral Daily Geovanny Malik PA-C      melatonin  3 mg Oral HS Geovannycassandra Malik PA-C      triamcinolone   Topical BID BUSTER Lee       Continuous Infusions:     PRN Meds:   hydrALAZINE, 5 mg, Q4H PRN  Labetalol HCl, 10 mg, Q4H PRN        Invasive Devices Review  Invasive Devices  Report    Peripheral Intravenous Line            Peripheral IV 02/17/22 Left Antecubital 1 day Drain            External Urinary Catheter 1 day              ---------------------------------------------------------------------------------------  Advance Directive and Living Will:      Power of :    POLST:    ---------------------------------------------------------------------------------------  Care Time Delivered:   No Critical Care time spent       [employer], PA-C      Portions of the record may have been created with voice recognition software  Occasional wrong word or "sound a like" substitutions may have occurred due to the inherent limitations of voice recognition software    Read the chart carefully and recognize, using context, where substitutions have occurred

## 2022-02-19 NOTE — DISCHARGE INSTR - AVS FIRST PAGE
Please follow up after discharge with:  Neurology in 6 weeks  Ophthalmology within one week  Primary care physician within one week    You will need to call to make appointments with these above providers  You will be starting new medications that were given during your hospital stay:  Atorvastatin (Lipitor)  Clopidogrel (Plavix)  Prescriptions were sent to your local pharmacy  Neurology is recommending that you continue your Aspirin that you were previously taking  You will continue this for the next 3 months unless the physician instructs you otherwise at your follow up appointment  Please continue to take you other home medications as instructed  You make set up an account with [employer] and/or contact Medical Records for copies of your records

## 2022-02-19 NOTE — CASE MANAGEMENT
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  Secondary Insurance:     DISCHARGE DETAILS:    Cm spoke with patient and daughter no perferece for home health agenicies  A post acute care recommendation was made by your care team for [facility] [address]  Discussed Freedom of Choice with both patient and caregiver  List of agencies given to both patient and caregiver via in person and via phone with daughter  both patient and caregiver aware the list is custom filtered for them by preference  and that [facility] post acute providers are designated  [address] [place] [address] accepted patient for [facility]  Cm called patient daughter Thierry Way agreeable with homehealth  Ecin updated  Patient and daughter aware patient needs md appts made Monday eye dr and neurology information will be on dc instructions   Patient daughter is picking patient up when discharged    Home with home health services    Cm faxed avs/dc sum /f2f to [facility] home health       Discharge planning discussed with[de-identified] patient and daughter Xochilt Larose of Choice: Yes  Comments - Freedom of Choice: CM discussed FOC for home health agencies , jimmietent and daughter have no preference  agreeable with CM placing referrals in ecin for availability  [facility] offered soc 2/20/22  Quirino Mahajan aware and accepted  [facility] updated on ecin of pateint discharge today   to notify patient daughter Rayna Leung with visit time tomorrow  CM contacted family/caregiver?: Yes  Were Treatment Team discharge recommendations reviewed with patient/caregiver?: Yes  Did patient/caregiver verbalize understanding of patient care needs?: Yes  Were patient/caregiver advised of the risks associated with not following Treatment Team discharge recommendations?: Yes    Contacts  Patient Contacts: Юлия Medrano  Relationship to Patient[de-identified] Family  Contact Method: Phone  Reason/Outcome: Discharge [address]  Name[de-identified] Other ([facility] health// [facility])  Homebound Criteria Met[de-identified] [address] of Another Person for Safe Ambulation or to Leave the Home  Supporting Clincal Findings[de-identified] Fatigues Easliy in Short Distances                   Treatment Team Recommendation: [facility] [address]  Discharge Destination Plan[de-identified] Home with [place] at Discharge : Family

## 2022-02-19 NOTE — DISCHARGE SUMMARY
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  · Neurology and ophthalmology consult upon evaluation in the emergency department  There was not ophthalmalgic source identified for the visual loss  Neurology recommended tPA and was administered  · On am of 02/18, pt reported pain and sensation of foreign body in right eye  Eye exam revealed small lint like foreign body and was removed  Eye pack applied after  Pt reported relief in pain and foreign body sensation  · CT head:  No acute intracranial findings  · CTA head/neck: Showed no hemodynamically significant stenosis, dissection or occlusion of the carotid or vertebral arteries  No intracranial aneurysm  Vessel irregularity throughout the anterior posterior circulation with vessel irregularity involving the right M1 segment and the bilateral posterior cerebral arteries more distal branches  Moderate to severe atherosclerotic disease also seen involving the intracranial portions of the bilateral internal carotid arteries notably the supraclinoid portions  · Echo: EF 65%, LV normal size and function, RV normal size and function, Trace MV regurg, Mild TV regurg  · MRI: No evidence of acute infarct, intracranial hemorrhage or mass  · Repeat CT head at 24 hours: No acute intracranial abnormalities  · No needle sticks, anticoagulation or anti-platelet agents for 24 hours post tPA    · Neuro checks per ischemic stroke protocol with reperfusion therapy  · PT/OT/ST evaluations completed  · Inpt ophthalmology not available at this campus  Recommend follow up outpatient  · A1C 5 8, Lipid panel checked - LDL 85  · Neurology on consult  · Per Neurology - can initiate antiplatelet and DVT prophylaxis if repeat neuro imaging is negative for hemorrhagic conversion/transformation  · Stroke education provided by nursing  · Continue Lipitor 40 mg daily   · DAPT started 02/19 with Aspirin 81 mg and Plavix 75 mg daily  Neurology recommending DAPT for 3 months, then continue with plavix monotherapy  · Lovenox for DVT prophylaxis while inpt  Started after repeat CT head was negative  · Recommend follow up with Neurology outpatient in 6 weeks  · Recommend follow-up with outpatient Ophthalmology as cause of visual disturbance likely not to be associated with stroke given MRI negative for acute infarct  H/O: stroke  Assessment & Plan  · History of Stroke 2009  · Residual deficit of left eye blindness  Essential hypertension  Assessment & Plan  · Continue home medications of Norvasc, Catapres and Cozaar  Medical Problems             Resolved Problems  Date Reviewed: 2/19/2022    None                Admission Date:   Admission Orders (From admission, onward)     Ordered        02/18/22 0116  Inpatient Admission  Once                        Admitting Diagnosis: Decreased vision [H54 7]  CVA (cerebral vascular accident) ([facility] [address] ) [I63 9]  Visual changes [H53 9]  Stroke-like symptom [R29 90]    HPI: According to H&P by Duncan Pollock PA-C:  Thaddeus Elizabeth is a 78 y o  female who presents with a past medical history of stroke in 2009 and hypertension  Patient has residual left eye blindness from her previous stroke  On the evening of 02/18/2022 at approximately 7:00 p m  the patient noticed that the vision in her right eye had changed where everything was blurry    She denies any other symptoms to include extremity weakness, dysarthria, dysphagia and facial droop  Upon evaluation in the emergency department, ophthalmology and neurology were contacted  A workup was completed to rule out any ophthalmologic pathologies of her right eye visual disturbance  There was no ophthalmologic pathology identified  Neurology recommended administering tPA  TPA was administered and completed around 12:43 a m  [facility] was contacted for admission  Procedures Performed:   Orders Placed This Encounter   Procedures    POC Ocular US       Summary of Hospital Course: 78 y o  female with PMHx of previous CVA with residual loss in left eye vision and HTN presented with painless right eye visual disturbance  Stroke alert was call in ED  NIHSS score 2  Ophthalmic evaluation by ED physician negative  tPA administered 02/19  Pt was admitted to Critical Care and stroke pathway was initiated  Pt was monitored closely post tPA administration  Echocardiogram did not show obvious cardioembolic source  MRI and 24 hour CT scan did not show evidence of acute infarct, intracranial hemorrhage or mass  Pt was initiated on DAPT therapy per Neurology recommendation  Pt was evaluated by therapy (PT/OT/ST)  PT/OT recommending home with [facility]  Pt and daughter instructed that pt will need follow up with Neurology in 6 weeks and PCP and Ophthamology after discharge  Instructed on new medications Atorvastatin and Plavix with should be taken daily  Instructed on continuing home antihypertensive medications daily which is how they are prescribed (not as need)  Instructed on continuing home ASA for the next 3 months  Case management setting up [place] [address]  Understanding was verbalized  All questions answered  Significant Findings, Care, Treatment and Services Provided:     CT head wo contrast   Final Result by Ramses Bradshaw MD (02/18 2534)      No acute intracranial abnormality                    Workstation performed: [id number]         MRI brain wo contrast   Final Result by Sis Arvizu MD Carline (02/18 1730)      No evidence of acute infarct, intracranial hemorrhage or mass  Workstation performed: [id number]         CTA stroke alert (head/neck)   Final Result by Gustabo Goltz, MD (02/18 0030)      No hemodynamically significant stenosis, dissection or occlusion of the carotid or vertebral arteries  No intracranial aneurysm  Vessel irregularity throughout the anterior and posterior circulation with vessel irregularity involving the right M1 segment and the bilateral posterior cerebral arteries and more distal branches  Moderate to severe    atherosclerotic disease also seen involving the intracranial portions of the bilateral internal carotid arteries notably the supraclinoid portions  Findings were directly discussed with Dr Mackenzie Linares at 12:20 AM, 2/18/2022  Workstation performed: [id number]         CT stroke alert brain   Final Result by Gustabo Goltz, MD (02/18 0030)      No acute intracranial abnormality  Findings were directly discussed with Dr Mackenzie Linares at 12:20 AM, 2/18/2022  Workstation performed: [id number]           · ECHO:  EF 65%, LV normal size and function, RV normal size and function, Trace MV regurgitation, Mild TV regurgitation  · Stroke alert called 02/17  Neurology consult - received tPA  Complications:     Condition at Discharge: stable         Discharge instructions/Information to patient and family:   See after visit summary for information provided to patient and family  Provisions for Follow-Up Care:  See after visit summary for information related to follow-up care and any pertinent home health orders  PCP: Amber Jesus MD    Disposition: Home    Planned Readmission: No    Discharge Statement   I spent 30 minutes discharging the patient  This time was spent on the day of discharge  I had direct contact with the patient on the day of discharge   Additional documentation is required if more than 30 minutes were spent on discharge  Discharge Medications:  See after visit summary for reconciled discharge medications provided to patient and family

## 2022-02-19 NOTE — PROGRESS NOTES
Late Entry   2100 neuro check change noted  Patient confused to time, place, and situation  Initially A&Ox4  Systolic B/P ranging in high 200's  [facility] PA made aware and new orders obtained  PRN Hydrazine and one time dose of Labetalol given  Patient taken to CT scan via bed, monitor, and this RN  Patient currently in bed resting with call bell in place

## 2022-02-19 NOTE — ASSESSMENT & PLAN NOTE
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  · Neurology and ophthalmology consult upon evaluation in the emergency department  There was not ophthalmalgic source identified for the visual loss  Neurology recommended tPA and was administered  · On am of 02/18, pt reported pain and sensation of foreign body in right eye  Eye exam revealed small lint like foreign body and was removed  Eye pack applied after  Pt reported relief in pain and foreign body sensation  · CT head:  No acute intracranial findings  · CTA head/neck: Showed no hemodynamically significant stenosis, dissection or occlusion of the carotid or vertebral arteries  No intracranial aneurysm  Vessel irregularity throughout the anterior posterior circulation with vessel irregularity involving the right M1 segment and the bilateral posterior cerebral arteries more distal branches  Moderate to severe atherosclerotic disease also seen involving the intracranial portions of the bilateral internal carotid arteries notably the supraclinoid portions  · Echo: EF 65%, LV normal size and function, RV normal size and function, Trace MV regurg, Mild TV regurg  · MRI: No evidence of acute infarct, intracranial hemorrhage or mass  · Repeat CT head at 24 hours: No acute intracranial abnormalities  · No needle sticks, anticoagulation or anti-platelet agents for 24 hours post tPA  · Neuro checks per ischemic stroke protocol with reperfusion therapy  · PT/OT/ST evaluations completed  · Inpt ophthalmology not available at this [place]  Recommend follow up outpatient    · A1C 5 8, Lipid panel checked - LDL 85  · Neurology on consult  · Per Neurology - can initiate antiplatelet and DVT prophylaxis if repeat neuro imaging is negative for hemorrhagic conversion/transformation  · Stroke education provided by nursing  · Continue Lipitor 40 mg daily   · DAPT started 02/19 with Aspirin 81 mg and Plavix 75 mg daily  Neurology recommending DAPT for 3 months, then continue with plavix monotherapy  · Lovenox for DVT prophylaxis while inpt  Started after repeat CT head was negative  · Recommend follow up with Neurology outpatient in 6 weeks  · Recommend follow-up with outpatient Ophthalmology as cause of visual disturbance likely not to be associated with stroke given MRI negative for acute infarct

## 2022-02-19 NOTE — PLAN OF CARE
Problem: MOBILITY - ADULT  Goal: Maintain or return to baseline ADL function  Description: INTERVENTIONS:  -  Assess patient's ability to carry out ADLs; assess patient's baseline for ADL function and identify physical deficits which impact ability to perform ADLs (bathing, care of mouth/teeth, toileting, grooming, dressing, etc )  - Assess/evaluate cause of self-care deficits   - Assess range of motion  - Assess patient's mobility; develop plan if impaired  - Assess patient's need for assistive devices and provide as appropriate  - Encourage maximum independence but intervene and supervise when necessary  - Involve family in performance of ADLs  - Assess for home care needs following discharge   - Consider OT consult to assist with ADL evaluation and planning for discharge  - Provide patient education as appropriate  Outcome: Progressing  Goal: Maintains/Returns to pre admission functional level  Description: INTERVENTIONS:  - Perform BMAT or MOVE assessment daily    - Set and communicate daily mobility goal to care team and patient/family/caregiver  - Collaborate with rehabilitation services on mobility goals if consulted  - Perform Range of Motion 3 times a day  - Reposition patient every 2 hours    - Dangle patient 3 times a day  - Stand patient 3 times a day  - Ambulate patient 3 times a day  - Out of bed to chair 3 times a day   - Out of bed for meals 3 times a day  - Out of bed for toileting  - Record patient progress and toleration of activity level   Outcome: Progressing     Problem: Potential for Falls  Goal: Patient will remain free of falls  Description: INTERVENTIONS:  - Educate patient/family on patient safety including physical limitations  - Instruct patient to call for assistance with activity   - Consult OT/PT to assist with strengthening/mobility   - Keep Call bell within reach  - Keep bed low and locked with side rails adjusted as appropriate  - Keep care items and personal belongings within reach  - Initiate and maintain comfort rounds  - Make Fall Risk Sign visible to staff  - Offer Toileting every 2 Hours, in advance of need  - Initiate/Maintain bed/chair alarm  - Obtain necessary fall risk management equipment:   - Apply yellow socks and bracelet for high fall risk patients  - Consider moving patient to room near nurses station  Outcome: Progressing     Problem: PAIN - ADULT  Goal: Verbalizes/displays adequate comfort level or baseline comfort level  Description: Interventions:  - Encourage patient to monitor pain and request assistance  - Assess pain using appropriate pain scale  - Administer analgesics based on type and severity of pain and evaluate response  - Implement non-pharmacological measures as appropriate and evaluate response  - Consider cultural and social influences on pain and pain management  - Notify physician/advanced practitioner if interventions unsuccessful or patient reports new pain  Outcome: Progressing     Problem: INFECTION - ADULT  Goal: Absence or prevention of progression during hospitalization  Description: INTERVENTIONS:  - Assess and monitor for signs and symptoms of infection  - Monitor lab/diagnostic results  - Monitor all insertion sites, i e  indwelling lines, tubes, and drains  - Monitor endotracheal if appropriate and nasal secretions for changes in amount and color  - [place] appropriate cooling/warming therapies per order  - Administer medications as ordered  - Instruct and encourage patient and family to use good hand hygiene technique  - Identify and instruct in appropriate isolation precautions for identified infection/condition  Outcome: Progressing  Goal: Absence of fever/infection during neutropenic period  Description: INTERVENTIONS:  - Monitor WBC    Outcome: Progressing     Problem: SAFETY ADULT  Goal: Maintain or return to baseline ADL function  Description: INTERVENTIONS:  -  Assess patient's ability to carry out ADLs; assess patient's baseline for ADL function and identify physical deficits which impact ability to perform ADLs (bathing, care of mouth/teeth, toileting, grooming, dressing, etc )  - Assess/evaluate cause of self-care deficits   - Assess range of motion  - Assess patient's mobility; develop plan if impaired  - Assess patient's need for assistive devices and provide as appropriate  - Encourage maximum independence but intervene and supervise when necessary  - Involve family in performance of ADLs  - Assess for home care needs following discharge   - Consider OT consult to assist with ADL evaluation and planning for discharge  - Provide patient education as appropriate  Outcome: Progressing  Goal: Maintains/Returns to pre admission functional level  Description: INTERVENTIONS:  - Perform BMAT or MOVE assessment daily    - Set and communicate daily mobility goal to care team and patient/family/caregiver  - Collaborate with rehabilitation services on mobility goals if consulted  - Perform Range of Motion 3 times a day  - Reposition patient every 2 hours    - Dangle patient 3 times a day  - Stand patient 3 times a day  - Ambulate patient 3 times a day  - Out of bed to chair 3 times a day   - Out of bed for meals 3 times a day  - Out of bed for toileting  - Record patient progress and toleration of activity level   Outcome: Progressing  Goal: Patient will remain free of falls  Description: INTERVENTIONS:  - Educate patient/family on patient safety including physical limitations  - Instruct patient to call for assistance with activity   - Consult OT/PT to assist with strengthening/mobility   - Keep Call bell within reach  - Keep bed low and locked with side rails adjusted as appropriate  - Keep care items and personal belongings within reach  - Initiate and maintain comfort rounds  - Make Fall Risk Sign visible to staff  - Offer Toileting every 2 Hours, in advance of need  - Initiate/Maintain bed Christa Abby alarm  - Obtain necessary fall risk management equipment:   - Apply yellow socks and bracelet for high fall risk patients  - Consider moving patient to room near nurses station  Outcome: Progressing     Problem: DISCHARGE PLANNING  Goal: Discharge to home or other facility with appropriate resources  Description: INTERVENTIONS:  - Identify barriers to discharge w/patient and caregiver  - Arrange for needed discharge resources and transportation as appropriate  - Identify discharge learning needs (meds, wound care, etc )  - Arrange for interpretive services to assist at discharge as needed  - Refer to Case Management Department for coordinating discharge planning if the patient needs post-hospital services based on physician/advanced practitioner order or complex needs related to functional status, cognitive ability, or social support system  Outcome: Progressing     Problem: Knowledge Deficit  Goal: Patient/family/caregiver demonstrates understanding of disease process, treatment plan, medications, and discharge instructions  Description: Complete learning assessment and assess knowledge base  Interventions:  - Provide teaching at level of understanding  - Provide teaching via preferred learning methods  Outcome: Progressing     Problem: Neurological Deficit  Goal: Neurological status is stable or improving  Description: Interventions:  - Monitor and assess patient's level of consciousness, motor function, sensory function, and level of assistance needed for ADLs  - Monitor and report changes from baseline  Collaborate with interdisciplinary team to initiate plan and implement interventions as ordered  - Provide and maintain a safe environment  - Consider seizure precautions  - Consider fall precautions  - Consider aspiration precautions  - Consider bleeding precautions  Outcome: Progressing     Problem:  Activity Intolerance/Impaired Mobility  Goal: Mobility/activity is maintained at optimum level for patient  Description: Interventions:  - Assess and monitor patient  barriers to mobility and need for assistive/adaptive devices  - Assess patient's emotional response to limitations  - Collaborate with interdisciplinary team and initiate plans and interventions as ordered  - Encourage independent activity per ability   - Maintain proper body alignment  - Perform active/passive rom as tolerated/ordered  - Plan activities to conserve energy   - Turn patient as appropriate  Outcome: Progressing     Problem: Communication Impairment  Goal: Ability to express needs and understand communication  Description: Assess patient's communication skills and ability to understand information  Patient will demonstrate use of effective communication techniques, alternative methods of communication and understanding even if not able to speak  - Encourage communication and provide alternate methods of communication as needed  - Collaborate with case management/ for discharge needs  - Include patient/family/caregiver in decisions related to communication  Outcome: Progressing     Problem: Potential for Aspiration  Goal: Non-ventilated patient's risk of aspiration is minimized  Description: Assess and monitor vital signs, respiratory status, and labs (WBC)  Monitor for signs of aspiration (tachypnea, cough, rales, wheezing, cyanosis, fever)  - Assess and monitor patient's ability to swallow  - Place patient up in chair to eat if possible  - HOB up at 90 degrees to eat if unable to get patient up into chair   - Supervise patient during oral intake  - Instruct patient/ family to take small bites  - Instruct patient/ family to take small single sips when taking liquids    - Follow patient-specific strategies generated by speech pathologist   Outcome: Progressing     Problem: Nutrition  Goal: Nutrition/Hydration status is improving  Description: Monitor and assess patient's nutrition/hydration status for malnutrition (ex- brittle hair, bruises, dry skin, pale skin and conjunctiva, muscle wasting, smooth red tongue, and disorientation)  Collaborate with interdisciplinary team and initiate plan and interventions as ordered  Monitor patient's weight and dietary intake as ordered or per policy  Utilize nutrition screening tool and intervene per policy  Determine patient's food preferences and provide high-protein, high-caloric foods as appropriate  - Assist patient with eating   - Allow adequate time for meals   - Encourage patient to take dietary supplement as ordered  - Collaborate with clinical nutritionist   - Include patient/family/caregiver in decisions related to nutrition  Outcome: Progressing     Problem: Potential for Aspiration  Goal: Ventilated patient's risk of aspiration is minimized  Description: Assess and monitor vital signs, respiratory status, airway cuff pressure, and labs (WBC)  Monitor for signs of aspiration (tachypnea, cough, rales, wheezing, cyanosis, fever)  - Elevate head of bed 30 degrees if patient has tube feeding   - Monitor tube feeding    Outcome: Completed

## 2022-02-19 NOTE — UTILIZATION REVIEW
Initial Clinical Review    Admission: Date/Time/Statement:   Admission Orders (From admission, onward)     Ordered        02/18/22 0116  Inpatient Admission  Once                      Orders Placed This Encounter   Procedures    Inpatient Admission     Standing Status:   Standing     Number of Occurrences:   1     Order Specific Question:   Level of Care     Answer:   Critical Care [15]     Order Specific Question:   Estimated length of stay     Answer:   More than 2 Midnights     Order Specific Question:   Certification     Answer:   I certify that inpatient services are medically necessary for this patient for a duration of greater than two midnights  See H&P and MD Progress Notes for additional information about the patient's course of treatment  ED Arrival Information     Expected Arrival Acuity    - 2/17/2022 23:20 Urgent         Means of arrival Escorted by Service Admission type    Walk-In Family Member Critical Care/ICU Urgent         Arrival complaint    Vision changes        Chief Complaint   Patient presents with    Decreased Visual Acuity     Patient complaining of decreased visual acuity in right eye around 7pm  Patient took losartan and amlodipine around 8pm for elevated blood pressure  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  Initial Presentation: 79 yo female to ED from home w/ R eye vision changes , everything was blurry   In ED opthalmology consulted and decision to admit to r/o opthalmologic pathologies  neuro recommended tPA   PMHX stroke and HTN Given and admitted to ICU for stroke like sx  Plan for MRI , echo , neuro checks , iv labetalol , PT OT eval   HTN labetalol q4hr prn for BP >160 post tPA ,c ont norvasc, catapres and cozaar  PE: no vision L eye from previous CVA , blurry vision R eye   Date: 2/19  Day 2: pt became confused last night at 2200  CT revealed no acute abnormalities  Repeat CT head - wnl , asa   MRI neg       PE: blurry vision R eye , L eye blindness, disoriented    ED Triage Vitals   Temperature Pulse Respirations Blood Pressure SpO2   02/17/22 2328 02/17/22 2328 02/17/22 2328 02/17/22 2328 02/17/22 2328   (!) 97 4 °F (36 3 °C) 103 16 (!) 207/91 98 %      Temp Source Heart Rate Source Patient Position - Orthostatic VS BP Location FiO2 (%)   02/17/22 2328 02/17/22 2328 02/17/22 2328 02/17/22 2328 --   Temporal Monitor Sitting Left arm       Pain Score       02/18/22 0230       No Pain          Wt Readings from Last 1 Encounters:   02/19/22 72 6 kg (160 lb 0 9 oz)     Additional Vital Signs:   02/19/22 0805 98 3 °F (36 8 °C) 83 17 165/74 106 97 % None (Room air) Sitting   02/19/22 0600 -- 68 16 113/58 80 96 % -- --   02/19/22 0500 -- 78 29 Abnormal  -- -- 97 % -- --   02/19/22 0400 98 °F (36 7 °C) 72 19 103/58 76 96 % None (Room air) Lying   02/19/22 0300 -- 87 26 Abnormal  -- -- 97 % -- --   02/19/22 0200 -- 78 12 126/58 83 96 % -- --   02/19/22 0100 -- 78 14 -- -- 97 % -- --   02/19/22 0000 98 3 °F (36 8 °C) 78 14 116/59 81 96 % None (Room air) Lying   02/18/22 2300 -- 78 13 102/51 73 97 % -- --   02/18/22 2215 -- 80 18 181/85 Abnormal  122 96 % -- --   02/18/22 2200 -- 113 Abnormal  48 Abnormal  -- -- 98 % -- --   02/18/22 2117 -- -- -- 206/94 Abnormal  135 -- -- --   02/18/22 2100 -- 86 16 204/92 Abnormal  132 97 % -- --   02/18/22 2000 -- 76 17 163/74 107 97 % None (Room air) Lying   02/18/22 1900 98 °F (36 7 °C) 77 16 141/63 91 97 % None (Room air) Lying   02/18/22 1800 -- 87 11 Abnormal  141/71 93 98 % -- --   02/18/22 1600 -- 79 19 158/75 108 98 % -- --   02/18/22 1525 98 °F (36 7 °C) -- -- -- -- -- -- --   02/18/22 1430 -- 87 26 Abnormal  153/89 114 99 % -- --   02/18/22 1400 -- 86 29 Abnormal  156/82 110 99 % -- --   02/18/22 1330 -- 90 27 Abnormal  121/78 94 99 % -- --   02/18/22 1300 97 7 °F (36 5 °C) 90 30 Abnormal  118/62 -- 99 % None (Room air) --   02/18/22 1200 97 7 °F (36 5 °C) 88 21 132/62 -- 98 % -- --   02/18/22 1130 -- 83 16 138/64 92 97 % -- --   02/18/22 1100 -- 88 28 Abnormal  124/60 86 97 % -- --   02/18/22 1030 -- 92 15 142/65 93 99 % -- --   02/18/22 1000 -- 93 20 129/61 88 98 % -- --   02/18/22 0930 -- 81 17 130/62 89 98 % -- --   02/18/22 0900 97 5 °F (36 4 °C) 93 17 138/70 98 99 % None (Room air) --   02/18/22 0830 97 7 °F (36 5 °C) 87 26 Abnormal  142/66 98 98 % None (Room air) --   02/18/22 0800 98 1 °F (36 7 °C) 92 28 Abnormal  149/67 -- 98 % None (Room air) Lying   02/18/22 0730 98 °F (36 7 °C) 91 26 Abnormal  142/64 92 97 % None (Room air) --   02/18/22 0700 98 °F (36 7 °C) 95 31 Abnormal  158/74 -- 99 % None (Room air) Lying   02/18/22 0630 97 7 °F (36 5 °C) 101 21 148/71 98 99 % -- --   02/18/22 0600 97 7 °F (36 5 °C) 95 17 157/73 105 98 % None (Room air) --   02/18/22 0530 97 7 °F (36 5 °C) 96 29 Abnormal  150/67 96 99 % None (Room air) --   02/18/22 0500 97 5 °F (36 4 °C) 93 23 Abnormal  155/68 -- 99 % None (Room air) --   02/18/22 0450 -- -- -- 173/75 Abnormal  -- -- -- --   02/18/22 0430 97 7 °F (36 5 °C) 81 14 162/74 107 99 % -- --   02/18/22 0400 97 7 °F (36 5 °C) 79 15 153/70 -- 98 % None (Room air) Lying   02/18/22 0345 -- 79 12 160/70 101 99 % -- --   02/18/22 0330 98 °F (36 7 °C) 88 20 188/86 Abnormal  123 100 % -- --   02/18/22 0315 98 °F (36 7 °C) 88 15 175/82 Abnormal  118 98 % -- --   02/18/22 0300 98 °F (36 7 °C) 91 26 Abnormal  176/81 Abnormal  117 99 % None (Room air) Lying   02/18/22 0242 98 5 °F (36 9 °C) 90 22 186/87 Abnormal  125 98 % None (Room air) Lying   02/18/22 0200 -- 81 16 172/81 Abnormal  -- 99 % None (Room air) --   02/18/22 0145 -- 84 14 191/84 Abnormal  -- 99 % None (Room air) --   02/18/22 0130 -- 84 17 164/79 -- 98 % None (Room air) Sitting   02/18/22 0115 -- 80 18 164/79 -- 98 % None (Room air) --   02/18/22 0100 -- 86 20 190/92 Abnormal  -- 96 % None (Room air) Sitting   02/18/22 0045 -- 91 18 184/83 Abnormal  -- 98 % None (Room air) Sitting   02/18/22 0030 -- 92 17 179/88 Abnormal  -- 99 % None (Room air) Sitting   02/18/22 0015 -- 90 17 154/70 -- 99 % None (Room air) --   02/18/22 0000 -- 93 17 182/85 Abnormal  -- 99 % None (Room air) Sitting   02/17/22 2345 -- 92 18 207/91 Abnormal  -- 99 % None (Room air) Sitting     Pertinent Labs/Diagnostic Test Results:   2/18 Echo   Left Ventricle: Left ventricular cavity size is normal  Wall thickness is mildly increased  The left ventricular ejection fraction is 65%  Systolic function is normal  Wall motion is normal  Diastolic function is normal     Right Ventricle: Right ventricular cavity size is normal  Systolic function is normal     Atrial Septum: Saline contrast infusion (bubble study) was not performed  If clinically indicated, please order limited echocardiogram with bubble study    Mitral Valve: There is trace regurgitation    Tricuspid Valve: There is mild regurgitation    No prior study available for comparison    2/17 CT brain    No acute intracranial abnormality  2/18 CTA head /neck No hemodynamically significant stenosis, dissection or occlusion of the carotid or vertebral arteries       No intracranial aneurysm  Vessel irregularity throughout the anterior and posterior circulation with vessel irregularity involving the right M1 segment and the bilateral posterior cerebral arteries and more distal branches   Moderate to severe   atherosclerotic disease also seen involving the intracranial portions of the bilateral internal carotid arteries notably the supraclinoid portions      2/18 MRI brain    No evidence of acute infarct, intracranial hemorrhage or mass  2/18 CT head No acute intracranial abnormality        Results from last 7 days   Lab Units 02/19/22  0506 02/18/22  0011   WBC Thousand/uL 7 56 6 66   HEMOGLOBIN g/dL 12 7 11 5   HEMATOCRIT % 37 5 35 6   PLATELETS Thousands/uL 220 218     Results from last 7 days   Lab Units 02/19/22  0506 02/18/22  0011   SODIUM mmol/L 141 138   POTASSIUM mmol/L 3 7 3 8   CHLORIDE mmol/L 105 104 CO2 mmol/L 27 28   ANION GAP mmol/L 9 6   BUN mg/dL 14 21   CREATININE mg/dL 0 98 0 93   EGFR ml/min/1 73sq m 53 57   CALCIUM mg/dL 8 8 8 2*   MAGNESIUM mg/dL 2 4  --      Results from last 7 days   Lab Units 02/17/22  2349   POC GLUCOSE mg/dl 177*     Results from last 7 days   Lab Units 02/19/22  0506 02/18/22  0011   GLUCOSE RANDOM mg/dL 133 143*     Results from last 7 days   Lab Units 02/18/22  0011   HEMOGLOBIN A1C % 5 8*   EAG mg/dl 120       Results from last 7 days   Lab Units 02/18/22  0011   HS TNI 0HR ng/L 3     Results from last 7 days   Lab Units 02/18/22  0011   PROTIME seconds 13 4   INR  1 03   PTT seconds 27     Results from last 7 days   Lab Units 02/18/22  0011   CRP mg/L 0 5   SED RATE mm/hour 3     ED Treatment:   Medication Administration from 02/17/2022 2320 to 02/18/2022 0223       Date/Time Order Dose Route Action     02/18/2022 0039 alteplase (ACTIVASE) IV bolus 7 047 mg 7 047 mg Intravenous Given     02/18/2022 0043 alteplase (ACTIVASE) infusion 63 4 mg 63 4 mg Intravenous New Bag     02/18/2022 0147 sodium chloride 0 9 % bolus 50 mL 50 mL Intravenous Given     02/18/2022 0135 phenylephrine (PATRICIA-SYNEPHRINE) 2 5 % ophthalmic solution 1 drop 1 drop Right Eye Given     02/18/2022 0103 Labetalol HCl (NORMODYNE) injection 10 mg 10 mg Intravenous Given     02/18/2022 0149 Labetalol HCl (NORMODYNE) injection 10 mg 10 mg Intravenous Given        Past Medical History:   Diagnosis Date    Hypertension     Stroke [facility])      Present on Admission:   Stroke-like symptom   Essential hypertension      Admitting Diagnosis: Decreased vision [H54 7]  CVA (cerebral vascular accident) ([facility] [address] ) [I63 9]  Visual changes [H53 9]  Stroke-like symptom [R29 90]  Age/Sex: 78 y o  female  Admission Orders:  Scheduled Medications:  amLODIPine, 5 mg, Oral, Daily  aspirin, 81 mg, Oral, Daily  atorvastatin, 40 mg, Oral, QPM  [START ON 2/21/2022] cloNIDine, 1 patch, Transdermal, Weekly  clopidogrel, 75 mg, Oral, Daily  enoxaparin, 40 mg, Subcutaneous, Q24H STEWART  losartan, 50 mg, Oral, Daily  melatonin, 3 mg, Oral, HS  triamcinolone, , Topical, BID      Continuous IV Infusions:     PRN Meds:  hydrALAZINE, 5 mg, Intravenous, Q4H PRN  2/18 x2  Labetalol HCl, 10 mg, Intravenous, Q4H PRN  2/18 x1    Neuro checks q4hr  OT PT eval   NPO to reg diet     IP CONSULT TO NEUROLOGY  IP CONSULT TO PHYSICAL MEDICINE REHAB  IP CONSULT TO CASE MANAGEMENT    Network Utilization Review Department  ATTENTION: Please call with any questions or concerns to [phone] and carefully listen to the prompts so that you are directed to the right person  All voicemails are confidential   Lynette Kussmaul all requests for admission clinical reviews, approved or denied determinations and any other requests to dedicated fax number below belonging to the campus where the patient is receiving treatment   List of dedicated fax numbers for the Facilities:  [address] DENIALS (Administrative/Medical Necessity) [phone]   [address] (Maternity/NICU/Pediatrics) [phone]   [address]  [address] [facility] [place] [address] [address] [place] [address] [address] [facility] [place] [address] [phone]

## 2022-02-19 NOTE — PLAN OF CARE
Problem: MOBILITY - ADULT  Goal: Maintain or return to baseline ADL function  Description: INTERVENTIONS:  -  Assess patient's ability to carry out ADLs; assess patient's baseline for ADL function and identify physical deficits which impact ability to perform ADLs (bathing, care of mouth/teeth, toileting, grooming, dressing, etc )  - Assess/evaluate cause of self-care deficits   - Assess range of motion  - Assess patient's mobility; develop plan if impaired  - Assess patient's need for assistive devices and provide as appropriate  - Encourage maximum independence but intervene and supervise when necessary  - Involve family in performance of ADLs  - Assess for home care needs following discharge   - Consider OT consult to assist with ADL evaluation and planning for discharge  - Provide patient education as appropriate  Outcome: Adequate for Discharge  Goal: Maintains/Returns to pre admission functional level  Description: INTERVENTIONS:  - Perform BMAT or MOVE assessment daily    - Set and communicate daily mobility goal to care team and patient/family/caregiver  - Collaborate with rehabilitation services on mobility goals if consulted  - Perform Range of Motion 3 times a day  - Reposition patient every 2 hours    - Dangle patient 3 times a day  - Stand patient 3 times a day  - Ambulate patient 3 times a day  - Out of bed to chair 3 times a day   - Out of bed for meals 3 times a day  - Out of bed for toileting  - Record patient progress and toleration of activity level   Outcome: Adequate for Discharge

## 2022-02-21 LAB
ATRIAL RATE: 95 BPM
P AXIS: 61 DEGREES
PR INTERVAL: 200 MS
QRS AXIS: -35 DEGREES
QRSD INTERVAL: 94 MS
QT INTERVAL: 372 MS
QTC INTERVAL: 467 MS
T WAVE AXIS: 51 DEGREES
VENTRICULAR RATE: 95 BPM

## 2022-02-21 NOTE — DISCHARGE SUMMARY
Addendum to Discharge Summary on 02/19/2022 at 1:01 PM:    Hypertensive urgency, POA, Now resolved  · A/E/B -207, DBP 88-92 along with visual changes at time of admisson  · Treated with labetolol IV in ED to achieved goal BP prior to tPA  · Additional was given IV labetolol and hydralazine prn overnight post tPA to maintain goal BP per protocol  · Home medications resumed: catapres patch, norvasc and cozaar  · BP normalized and htn urgency resolved prior to discharge

## 2022-02-23 NOTE — UTILIZATION REVIEW
Notification of Discharge   This is a Notification of Discharge from our facility [address]  Please be advised that this patient has been discharge from our facility  Below you will find the admission and discharge date and time including the patients disposition  UTILIZATION REVIEW CONTACT:  Fiona Hidalgo  Utilization   Network Utilization Review Department  Phone: [phone] x carefully listen to the prompts  All voicemails are confidential   Email: [email or web address]     PHYSICIAN ADVISORY SERVICES:  FOR [id number] REVIEW - MEDICAL NECESSITY DENIAL  Phone: [phone]  Fax: [phone]  Email: [email or web address]     PRESENTATION DATE: 2/17/2022 11:25 PM  OBERVATION ADMISSION DATE:   INPATIENT ADMISSION DATE: 2/18/22  1:08 AM   DISCHARGE DATE: 2/19/2022  2:36 PM  DIS[facility]ITION[facility] [address] [place] [address] Road INFORMATION:  Send all requests for admission clinical reviews, approved or denied determinations and any other requests to dedicated fax number below belonging to the campus where the patient is receiving treatment   List of dedicated fax numbers:  [address] [place] [address] DENIALS (Administrative/Medical Necessity) [phone]   [address] (Maternity/NICU/Pediatrics) [phone]   Jefe Seen [phone]   [address] [phone]   [address] [place] [address] [phone]   2000 [facility] [address] [place] [address],[address] [place] [address] [phone]   [facility]  [phone]   2202 [facility] [address]  2401 [facility] 1000 [facility] [phone] ER medical staff 4 [facility] medical staff, family and patient

## 2022-11-11 ENCOUNTER — HOSPITAL ENCOUNTER (EMERGENCY)
Facility: HOSPITAL | Age: 82
Discharge: HOME | End: 2022-11-11
Attending: EMERGENCY MEDICINE

## 2022-11-11 VITALS
HEART RATE: 80 BPM | DIASTOLIC BLOOD PRESSURE: 95 MMHG | SYSTOLIC BLOOD PRESSURE: 208 MMHG | RESPIRATION RATE: 18 BRPM | WEIGHT: 167.11 LBS | BODY MASS INDEX: 28.53 KG/M2 | OXYGEN SATURATION: 98 % | HEIGHT: 64 IN | TEMPERATURE: 98.3 F

## 2022-11-11 DIAGNOSIS — I83.899 BLEEDING FROM VARICOSE VEIN: Primary | ICD-10-CM

## 2022-11-11 RX ORDER — BACITRACIN, NEOMYCIN, POLYMYXIN B 400; 3.5; 5 [USP'U]/G; MG/G; [USP'U]/G
1 OINTMENT TOPICAL ONCE
Status: COMPLETED | OUTPATIENT
Start: 2022-11-11 | End: 2022-11-11

## 2022-11-11 RX ADMIN — NEOMYCIN AND POLYMYXIN B SULFATES AND BACITRACIN ZINC 1 SMALL APPLICATION: 400; 3.5; 5 OINTMENT TOPICAL at 13:22

## 2022-11-11 NOTE — ED PROVIDER NOTES
History  Chief Complaint   Patient presents with   • Leg Injury     Pt reports that at 0300 today her right knee was itchy and when she scratched it she popped open a varicose vein  Bleeding controled with dressing at present  Patient complains of bleeding varicose vein from the right knee since earlier today  Has been bleeding on off since then  Takes aspirin daily  History provided by:  Patient   used: No    Medical Problem  Location:  Bleeding varicose vein  Quality:  Intermittent  Severity:  Mild  Onset quality:  Sudden  Duration:  8 hours  Timing:  Intermittent  Progression:  Waxing and waning  Chronicity:  New  Context:  Atraumatic bleeding varicose vein  Relieved by:  Pressure  Worsened by: Movement  Ineffective treatments:  Dressing  Associated symptoms: no abdominal pain, no chest pain, no congestion, no cough, no diarrhea, no ear pain, no fever, no headaches, no nausea, no rash, no shortness of breath, no sore throat, no vomiting and no wheezing        Prior to Admission Medications   Prescriptions Last Dose Informant Patient Reported? Taking? amLODIPine (NORVASC) 5 mg tablet   Yes No   Sig: Take 5 mg by mouth daily   aspirin (ECOTRIN LOW STRENGTH) 81 mg EC tablet   Yes No   Sig: Take 81 mg by mouth daily   atorvastatin (LIPITOR) 40 mg tablet   No No   Sig: Take 1 tablet (40 mg total) by mouth every evening   cloNIDine (CATAPRES-TTS-3) 0 3 mg/24 hr   Yes No   Si 3 patches   clopidogrel (PLAVIX) 75 mg tablet   No No   Sig: Take 1 tablet (75 mg total) by mouth daily   losartan (COZAAR) 50 mg tablet   Yes No   Sig: Take 50 mg by mouth daily      Facility-Administered Medications: None       Past Medical History:   Diagnosis Date   • Hypertension    • Psoriasis    • Stroke [facility])        History reviewed  No pertinent surgical history  History reviewed  No pertinent family history  I have reviewed and agree with the history as documented      E-Cigarette/Vaping   • E-Cigarette Use Never User      E-Cigarette/Vaping Substances     Social History     Tobacco Use   • Smoking status: Never Smoker   • Smokeless tobacco: Never Used   Vaping Use   • Vaping Use: Never used   Substance Use Topics   • Alcohol use: Never   • Drug use: Never       Review of Systems   Constitutional: Negative for chills and fever  HENT: Negative for congestion, ear pain, hearing loss, sore throat, trouble swallowing and voice change  Eyes: Negative for pain and discharge  Respiratory: Negative for cough, shortness of breath and wheezing  Cardiovascular: Negative for chest pain and palpitations  Gastrointestinal: Negative for abdominal pain, blood in stool, constipation, diarrhea, nausea and vomiting  Genitourinary: Negative for dysuria, flank pain, frequency and hematuria  Musculoskeletal: Negative for joint swelling, neck pain and neck stiffness  Skin: Negative for rash and wound  Neurological: Negative for dizziness, seizures, syncope, facial asymmetry and headaches  Psychiatric/Behavioral: Negative for hallucinations, self-injury and suicidal ideas  All other systems reviewed and are negative  Physical Exam  Physical Exam  Constitutional:       General: She is not in acute distress  Appearance: Normal appearance  She is not ill-appearing  HENT:      Head: Normocephalic and atraumatic  Right Ear: External ear normal       Left Ear: External ear normal       Nose: Nose normal       Mouth/Throat:      Mouth: Mucous membranes are moist    Eyes:      Extraocular Movements: Extraocular movements intact  Pupils: Pupils are equal, round, and reactive to light  Cardiovascular:      Rate and Rhythm: Normal rate and regular rhythm  Pulmonary:      Effort: Pulmonary effort is normal  No respiratory distress  Breath sounds: Normal breath sounds  Abdominal:      General: Abdomen is flat  Bowel sounds are normal  There is no distension        Palpations: Abdomen is soft  Tenderness: There is no abdominal tenderness  Musculoskeletal:         General: No swelling or tenderness  Cervical back: Normal range of motion and neck supple  Comments: Small oozing varicose vein in the right lateral popliteal region  Skin:     General: Skin is warm and dry  Capillary Refill: Capillary refill takes less than 2 seconds  Neurological:      General: No focal deficit present  Mental Status: She is alert and oriented to person, place, and time  Psychiatric:         Mood and Affect: Mood normal          Behavior: Behavior normal          Vital Signs  ED Triage Vitals [11/11/22 1258]   Temperature Pulse Respirations Blood Pressure SpO2   98 3 °F (36 8 °C) 80 18 (!) 208/95 98 %      Temp Source Heart Rate Source Patient Position - Orthostatic VS BP Location FiO2 (%)   Oral -- -- -- --      Pain Score       No Pain           Vitals:    11/11/22 1258   BP: (!) 208/95   Pulse: 80         Visual Acuity      ED Medications  Medications   neomycin-bacitracin-polymyxin b (NEOSPORIN) ointment 1 small application (has no administration in time range)       Diagnostic Studies  Results Reviewed     None                 No orders to display              Procedures  Procedures         ED Course  ED Course as of 11/11/22 1316   Fri Nov 11, 2022   1314 Procedure note:  Skin prepped and draped with Betadine  1% lidocaine with epinephrine used  Approximately 1 5 cc used  Horizontal mattress suture placed around varicose vein using a 4-0 Ethilon suture  Hemostasis achieved    Wound dressed with antibiotic dressing and Band-Aid                                               MDM    Disposition  Final diagnoses:   Bleeding from varicose vein     Time reflects when diagnosis was documented in both MDM as applicable and the Disposition within this note     Time User Action Codes Description Comment    11/11/2022  1:15 PM Otis Elizabeth Add [I71 307] Bleeding from varicose vein ED Disposition     ED Disposition   Discharge    Condition   Stable    Date/Time   Fri Nov 11, 2022  1:15 PM    Comment   Ifeanyi Joshi discharge to home/self care  Follow-up Information     Follow up With Specialties Details Why Contact Info    Quyen Rubi MD Family Medicine In 1 week For suture removal [address] [place] [address] [phone]            Patient's Medications   Discharge Prescriptions    No medications on file       No discharge procedures on file      PDMP Review     None          ED Provider  Electronically Signed by           Elroy Orr MD  11/11/22 0895

## 2023-05-16 ENCOUNTER — HOSPITAL ENCOUNTER (EMERGENCY)
Facility: HOSPITAL | Age: 83
Discharge: HOME | End: 2023-05-16
Attending: STUDENT IN AN ORGANIZED HEALTH CARE EDUCATION/TRAINING PROGRAM | Admitting: STUDENT IN AN ORGANIZED HEALTH CARE EDUCATION/TRAINING PROGRAM

## 2023-05-16 VITALS
WEIGHT: 167.99 LBS | DIASTOLIC BLOOD PRESSURE: 77 MMHG | OXYGEN SATURATION: 98 % | TEMPERATURE: 98.6 F | SYSTOLIC BLOOD PRESSURE: 167 MMHG | BODY MASS INDEX: 28.68 KG/M2 | HEART RATE: 91 BPM | HEIGHT: 64 IN | RESPIRATION RATE: 20 BRPM

## 2023-05-16 DIAGNOSIS — J02.9 SORE THROAT: Primary | ICD-10-CM

## 2023-05-16 LAB
S PYO DNA THROAT QL NAA+PROBE: NOT DETECTED
SARS-COV-2 RNA RESP QL NAA+PROBE: NEGATIVE

## 2023-05-16 RX ADMIN — DEXAMETHASONE SODIUM PHOSPHATE 10 MG: 10 INJECTION, SOLUTION INTRAMUSCULAR; INTRAVENOUS at 17:34

## 2023-05-16 NOTE — DISCHARGE INSTRUCTIONS
You will be contacted with results of the COVID and strep labs  If the strep test is positive, you will be prescribed antibiotics  You are administered a dose of Decadron  This should help your sore throat  In addition, you can gargle warm salt water/use honey/Chloraseptic spray    Follow-up with your primary care provider  Do not hesitate to be reevaluated in the ED for any concerning signs or symptoms

## 2023-05-16 NOTE — ED PROVIDER NOTES
History  Chief Complaint   Patient presents with   • Fever - 9 weeks to 74 years     Pt c/o intermittent fever with sore throat over past week  not taking OTC remedies for sx  Denies travel/sob/cough/n/v/d       History provided by:  Patient and relative  Sore Throat  Location:  Generalized  Quality:  Aching  Severity:  Mild  Onset quality:  Gradual  Duration:  2 days  Timing:  Constant  Progression:  Worsening  Chronicity:  New  Relieved by:  None tried  Worsened by:  Nothing  Ineffective treatments:  None tried  Associated symptoms: abdominal pain and fever    Associated symptoms: no chest pain, no chills, no cough, no drooling, no ear discharge, no ear pain, no headaches, no neck stiffness, no plugged ear sensation, no postnasal drip, no rash, no rhinorrhea, no shortness of breath, no sinus congestion, no trouble swallowing and no voice change       79 yo F  Presents with subjective fever, sore throat  Worsening for the last 2-3 days  Denies nasal congestion, cough  The patient's daughters are ill with similar symptoms  Denies nausea/vomiting/diarrhea  Eating/drinking well  Hasn't taken anything for pain  Able to tolerate her secretions  Denies shortness of breath  Prior to Admission Medications   Prescriptions Last Dose Informant Patient Reported? Taking?    amLODIPine (NORVASC) 5 mg tablet   Yes No   Sig: Take 5 mg by mouth daily   aspirin (ECOTRIN LOW STRENGTH) 81 mg EC tablet   Yes No   Sig: Take 81 mg by mouth daily   atorvastatin (LIPITOR) 40 mg tablet   No No   Sig: Take 1 tablet (40 mg total) by mouth every evening   cloNIDine (CATAPRES-TTS-3) 0 3 mg/24 hr   Yes No   Si 3 patches   clopidogrel (PLAVIX) 75 mg tablet   No No   Sig: Take 1 tablet (75 mg total) by mouth daily   losartan (COZAAR) 50 mg tablet   Yes No   Sig: Take 50 mg by mouth daily      Facility-Administered Medications: None       Past Medical History:   Diagnosis Date   • Hypertension    • Psoriasis    • Stroke [facility])        History reviewed  No pertinent surgical history  History reviewed  No pertinent family history  I have reviewed and agree with the history as documented  E-Cigarette/Vaping   • E-Cigarette Use Never User      E-Cigarette/Vaping Substances     Social History     Tobacco Use   • Smoking status: Never   • Smokeless tobacco: Never   Vaping Use   • Vaping Use: Never used   Substance Use Topics   • Alcohol use: Never   • Drug use: Never       Review of Systems   Constitutional: Positive for fever  Negative for activity change, appetite change and chills  HENT: Positive for sore throat  Negative for congestion, drooling, ear discharge, ear pain, postnasal drip, rhinorrhea, sinus pain, trouble swallowing and voice change  Respiratory: Negative for cough, choking, chest tightness, shortness of breath and wheezing  Cardiovascular: Negative for chest pain and palpitations  Gastrointestinal: Positive for abdominal pain  Negative for abdominal distention, diarrhea, nausea and vomiting  Musculoskeletal: Negative for arthralgias, myalgias, neck pain and neck stiffness  Skin: Negative for color change, pallor, rash and wound  Neurological: Negative for dizziness, syncope, weakness, light-headedness and headaches  Psychiatric/Behavioral: Negative for agitation and confusion  The patient is nervous/anxious  All other systems reviewed and are negative  Physical Exam  Physical Exam  Vitals and nursing note reviewed  Constitutional:       General: She is not in acute distress  Appearance: She is not ill-appearing or toxic-appearing  HENT:      Head: Normocephalic and atraumatic  Right Ear: Tympanic membrane, ear canal and external ear normal  There is no impacted cerumen  Left Ear: Tympanic membrane, ear canal and external ear normal  There is no impacted cerumen  Nose: No congestion or rhinorrhea        Mouth/Throat:      Mouth: Mucous membranes are moist       Pharynx: Oropharynx is clear  Posterior oropharyngeal erythema present  No oropharyngeal exudate  Comments: The posterior pharynx is mildly erythematous  Uvula midline  No exudates noted  Eyes:      General: No scleral icterus  Right eye: No discharge  Left eye: No discharge  Extraocular Movements: Extraocular movements intact  Conjunctiva/sclera: Conjunctivae normal       Pupils: Pupils are equal, round, and reactive to light  Neck:      Comments: Full range of motion of the neck  Cardiovascular:      Rate and Rhythm: Normal rate and regular rhythm  Pulses: Normal pulses  Heart sounds: Normal heart sounds  Pulmonary:      Effort: Pulmonary effort is normal  No respiratory distress  Breath sounds: Normal breath sounds  No stridor  No wheezing, rhonchi or rales  Chest:      Chest wall: No tenderness  Abdominal:      General: Bowel sounds are normal       Palpations: Abdomen is soft  Tenderness: There is no abdominal tenderness  There is no right CVA tenderness, left CVA tenderness, guarding or rebound  Musculoskeletal:         General: No swelling or tenderness  Cervical back: Normal range of motion and neck supple  No tenderness  Right lower leg: Edema present  Left lower leg: Edema present  Skin:     General: Skin is warm and dry  Capillary Refill: Capillary refill takes less than 2 seconds  Coloration: Skin is not jaundiced or pale  Findings: No bruising, erythema, lesion or rash  Neurological:      General: No focal deficit present  Mental Status: She is alert and oriented to person, place, and time  Mental status is at baseline  Cranial Nerves: No cranial nerve deficit  Sensory: No sensory deficit  Motor: No weakness  Coordination: Coordination normal    Psychiatric:         Mood and Affect: Mood normal          Behavior: Behavior normal          Thought Content:  Thought content normal          Judgment: Judgment normal        Vital Signs  ED Triage Vitals [05/16/23 1714]   Temperature Pulse Respirations Blood Pressure SpO2   98 6 °F (37 °C) 55 18 (!) 182/87 98 %      Temp Source Heart Rate Source Patient Position - Orthostatic VS BP Location FiO2 (%)   Temporal -- Lying Left arm --      Pain Score       5           Vitals:    05/16/23 1714 05/16/23 1715   BP: (!) 182/87 167/77   Pulse: 55 91   Patient Position - Orthostatic VS: Lying        ED Medications  Medications   dexamethasone oral liquid 10 mg 1 mL (10 mg Oral Given 5/16/23 1734)     Diagnostic Studies  Results Reviewed     Procedure Component Value Units Date/Time    COVID only [[id number]] Collected: 05/16/23 1733    Lab Status: In process Specimen: Nares from Nasopharyngeal Swab Updated: 05/16/23 1737    Strep A PCR [[id number]] Collected: 05/16/23 1733    Lab Status: In process Specimen: Throat Updated: 05/16/23 1736             No orders to display          Procedures  Procedures     ED Course     Medical Decision Making  The differential diagnoses include but are not limited to strep pharyngitis, URI, PTA  Vital signs reviewed  No significant physical exam findings other than for a mildly erythematous to your pharynx  No signs of exudate  Uvula is midline  No trismus  Normal ROM of the neck  The patient is able to tolerate secretions  No signs of shortness of breath  The patient was administered a dose of Decadron  Strep and COVID swabs are pending  The patient will be contacted with the results  Recommendations/return precautions discussed with the patient/her relative  All questions addressed  Stable for discharge  Sore throat: acute illness or injury  Amount and/or Complexity of Data Reviewed  External Data Reviewed: labs and notes  Labs: ordered  Decision-making details documented in ED Course  Risk  Prescription drug management        Disposition  Final diagnoses:   Sore throat     Time reflects when diagnosis was documented in both MDM as applicable and the Disposition within this note     Time User Action Codes Description Comment    5/16/2023  5:30 PM Larry Patrick Add [J02 9] Sore throat       ED Disposition     ED Disposition   Discharge    Condition   Stable    Date/Time   Tue May 16, 2023  5:29 PM    Comment   Ad Joshi discharge to home/self care  Follow-up Information    None         Discharge Medication List as of 5/16/2023  5:31 PM      CONTINUE these medications which have NOT CHANGED    Details   amLODIPine (NORVASC) 5 mg tablet Take 5 mg by mouth daily, Starting Mon 10/18/2021, Historical Med      aspirin (ECOTRIN LOW STRENGTH) 81 mg EC tablet Take 81 mg by mouth daily, Historical Med      atorvastatin (LIPITOR) 40 mg tablet Take 1 tablet (40 mg total) by mouth every evening, Starting Sat 2/19/2022, Normal      cloNIDine (CATAPRES-TTS-3) 0 3 mg/24 hr 0 3 patches, Starting Fri 1/7/2022, Historical Med      clopidogrel (PLAVIX) 75 mg tablet Take 1 tablet (75 mg total) by mouth daily, Starting Sun 2/20/2022, Normal      losartan (COZAAR) 50 mg tablet Take 50 mg by mouth daily, Starting Mon 10/18/2021, Historical Med             No discharge procedures on file      PDMP Review     None          ED Provider  Electronically Signed by           Wanda Loyola DO  05/16/23 9922

## 2024-05-02 ENCOUNTER — APPOINTMENT (EMERGENCY)
Dept: CT IMAGING | Facility: HOSPITAL | Age: 84
End: 2024-05-02
Payer: COMMERCIAL

## 2024-05-02 ENCOUNTER — APPOINTMENT (EMERGENCY)
Dept: RADIOLOGY | Facility: HOSPITAL | Age: 84
End: 2024-05-02
Payer: COMMERCIAL

## 2024-05-02 ENCOUNTER — HOSPITAL ENCOUNTER (OUTPATIENT)
Facility: HOSPITAL | Age: 84
Setting detail: OBSERVATION
Discharge: HOME | End: 2024-05-03
Attending: INTERNAL MEDICINE | Admitting: INTERNAL MEDICINE
Payer: COMMERCIAL

## 2024-05-02 DIAGNOSIS — R20.2 PARESTHESIA: ICD-10-CM

## 2024-05-02 DIAGNOSIS — R29.90 STROKE-LIKE SYMPTOM: Primary | ICD-10-CM

## 2024-05-02 DIAGNOSIS — I10 HYPERTENSION: ICD-10-CM

## 2024-05-02 LAB
2HR DELTA HS TROPONIN: -1 NG/L
ANION GAP SERPL CALCULATED.3IONS-SCNC: 7 MMOL/L (ref 4–13)
APTT PPP: 22 SECONDS (ref 23–37)
BUN SERPL-MCNC: 15 MG/DL (ref 5–25)
CALCIUM SERPL-MCNC: 9.3 MG/DL (ref 8.4–10.2)
CARDIAC TROPONIN I PNL SERPL HS: 4 NG/L
CARDIAC TROPONIN I PNL SERPL HS: 5 NG/L
CHLORIDE SERPL-SCNC: 106 MMOL/L (ref 96–108)
CO2 SERPL-SCNC: 25 MMOL/L (ref 21–32)
CREAT SERPL-MCNC: 0.82 MG/DL (ref 0.6–1.3)
ERYTHROCYTE [DISTWIDTH] IN BLOOD BY AUTOMATED COUNT: 13.5 % (ref 11.6–15.1)
FLUAV RNA RESP QL NAA+PROBE: NEGATIVE
FLUBV RNA RESP QL NAA+PROBE: NEGATIVE
GFR SERPL CREATININE-BSD FRML MDRD: 65 ML/MIN/1.73SQ M
GLUCOSE SERPL-MCNC: 112 MG/DL (ref 65–140)
GLUCOSE SERPL-MCNC: 119 MG/DL (ref 65–140)
HCT VFR BLD AUTO: 36.2 % (ref 34.8–46.1)
HGB BLD-MCNC: 12.2 G/DL (ref 11.5–15.4)
INR PPP: 1 (ref 0.84–1.19)
MCH RBC QN AUTO: 29.8 PG (ref 26.8–34.3)
MCHC RBC AUTO-ENTMCNC: 33.7 G/DL (ref 31.4–37.4)
MCV RBC AUTO: 89 FL (ref 82–98)
PLATELET # BLD AUTO: 235 THOUSANDS/UL (ref 149–390)
PMV BLD AUTO: 11.1 FL (ref 8.9–12.7)
POTASSIUM SERPL-SCNC: 4 MMOL/L (ref 3.5–5.3)
PROTHROMBIN TIME: 13.5 SECONDS (ref 11.6–14.5)
RBC # BLD AUTO: 4.09 MILLION/UL (ref 3.81–5.12)
RSV RNA RESP QL NAA+PROBE: NEGATIVE
SARS-COV-2 RNA RESP QL NAA+PROBE: NEGATIVE
SODIUM SERPL-SCNC: 138 MMOL/L (ref 135–147)
WBC # BLD AUTO: 9.18 THOUSAND/UL (ref 4.31–10.16)

## 2024-05-02 PROCEDURE — 99285 EMERGENCY DEPT VISIT HI MDM: CPT | Performed by: EMERGENCY MEDICINE

## 2024-05-02 PROCEDURE — 70496 CT ANGIOGRAPHY HEAD: CPT

## 2024-05-02 PROCEDURE — 93005 ELECTROCARDIOGRAM TRACING: CPT

## 2024-05-02 PROCEDURE — 0241U HB NFCT DS VIR RESP RNA 4 TRGT: CPT | Performed by: PHYSICIAN ASSISTANT

## 2024-05-02 PROCEDURE — 99223 1ST HOSP IP/OBS HIGH 75: CPT | Performed by: INTERNAL MEDICINE

## 2024-05-02 PROCEDURE — 80048 BASIC METABOLIC PNL TOTAL CA: CPT | Performed by: PHYSICIAN ASSISTANT

## 2024-05-02 PROCEDURE — 85730 THROMBOPLASTIN TIME PARTIAL: CPT | Performed by: PHYSICIAN ASSISTANT

## 2024-05-02 PROCEDURE — 82948 REAGENT STRIP/BLOOD GLUCOSE: CPT

## 2024-05-02 PROCEDURE — 70498 CT ANGIOGRAPHY NECK: CPT

## 2024-05-02 PROCEDURE — 73564 X-RAY EXAM KNEE 4 OR MORE: CPT

## 2024-05-02 PROCEDURE — 36415 COLL VENOUS BLD VENIPUNCTURE: CPT | Performed by: PHYSICIAN ASSISTANT

## 2024-05-02 PROCEDURE — 86618 LYME DISEASE ANTIBODY: CPT | Performed by: PHYSICIAN ASSISTANT

## 2024-05-02 PROCEDURE — 85027 COMPLETE CBC AUTOMATED: CPT | Performed by: PHYSICIAN ASSISTANT

## 2024-05-02 PROCEDURE — 99284 EMERGENCY DEPT VISIT MOD MDM: CPT

## 2024-05-02 PROCEDURE — 96374 THER/PROPH/DIAG INJ IV PUSH: CPT

## 2024-05-02 PROCEDURE — 84484 ASSAY OF TROPONIN QUANT: CPT | Performed by: PHYSICIAN ASSISTANT

## 2024-05-02 PROCEDURE — 85610 PROTHROMBIN TIME: CPT | Performed by: PHYSICIAN ASSISTANT

## 2024-05-02 RX ORDER — CLOPIDOGREL BISULFATE 75 MG/1
75 TABLET ORAL DAILY
Status: DISCONTINUED | OUTPATIENT
Start: 2024-05-03 | End: 2024-05-03 | Stop reason: HOSPADM

## 2024-05-02 RX ORDER — ATORVASTATIN CALCIUM 40 MG/1
40 TABLET, FILM COATED ORAL EVERY EVENING
Status: DISCONTINUED | OUTPATIENT
Start: 2024-05-03 | End: 2024-05-03 | Stop reason: HOSPADM

## 2024-05-02 RX ORDER — LOSARTAN POTASSIUM 50 MG/1
50 TABLET ORAL DAILY
Status: DISCONTINUED | OUTPATIENT
Start: 2024-05-03 | End: 2024-05-03 | Stop reason: HOSPADM

## 2024-05-02 RX ORDER — AMLODIPINE BESYLATE 5 MG/1
5 TABLET ORAL DAILY
Status: DISCONTINUED | OUTPATIENT
Start: 2024-05-03 | End: 2024-05-03 | Stop reason: HOSPADM

## 2024-05-02 RX ORDER — ATORVASTATIN CALCIUM 40 MG/1
40 TABLET, FILM COATED ORAL EVERY EVENING
Status: DISCONTINUED | OUTPATIENT
Start: 2024-05-02 | End: 2024-05-02

## 2024-05-02 RX ORDER — LABETALOL HYDROCHLORIDE 5 MG/ML
10 INJECTION, SOLUTION INTRAVENOUS ONCE
Status: COMPLETED | OUTPATIENT
Start: 2024-05-02 | End: 2024-05-02

## 2024-05-02 RX ORDER — ENOXAPARIN SODIUM 100 MG/ML
40 INJECTION SUBCUTANEOUS DAILY
Status: DISCONTINUED | OUTPATIENT
Start: 2024-05-03 | End: 2024-05-03 | Stop reason: HOSPADM

## 2024-05-02 RX ORDER — ASPIRIN 81 MG/1
81 TABLET, CHEWABLE ORAL DAILY
Status: DISCONTINUED | OUTPATIENT
Start: 2024-05-03 | End: 2024-05-02

## 2024-05-02 RX ADMIN — LABETALOL HYDROCHLORIDE 10 MG: 5 INJECTION, SOLUTION INTRAVENOUS at 19:02

## 2024-05-02 RX ADMIN — IOHEXOL 85 ML: 350 INJECTION, SOLUTION INTRAVENOUS at 17:36

## 2024-05-02 NOTE — QUICK NOTE
Stroke Alert Note   Dayan Joshi 83 y.o. female  MRN: [id number]   Unit/Bed#: ED 06 Encounter: [id number]     Stroke alert text received at: 5:24pm  Call from  received at: ***  Neurology response ***    83F   Left-sided weakness when she woke up this morning.  She thought she may have slept on it funny, but she has continued to drop things from her left hand throughout the day, and left leg weakness has affected her walking.  She also has new B/L knee pain today/    On exam she had weakness but no drift in the LUE, and decreased sensation in the left arm.  The LEs elevated symmetrically, and sensation was symmetric.  She  had some decreased ROM in the left knee compared to the right.      232/106, repeat 187/88    NIHSSS ***    CT head wo: ***  CTA head/neck: ***    Thrombolytic decision: {Thrombolytic decision:70085}   ***IV TNK was not given due to ***  - Admit on stroke pathway  - Recommend loading with aspirin 325mg once, then continuing 81 mg daily, and with plavix 300mg once, followed by 75mg daily.  - start lipitor 40mg Qday  - MRI brain wo, echo, lipid panel, HbA1c  - permissive HTN to 220/110 for 24 hours, monitor on telemetry  - normothermia, euglycemia  - avoid hypotonic fluids.    Thrombolytic decision: {Thrombolytic decision:47843}   ***Recommend administering IV TNK, once confirmed to have no contraindications, to treat disabling symptoms thought to be due to stroke within the first 4.5 hours after last known normal.  - Post TNK vitals and neurocheck protocol  - admit on stroke pathway to ICU/critical care for 24 hours  - repeat head CT 24 hours after administering TNK  - no AP or AC prior to above-mentioned repeat head CT  - start lipitor   - MRI brain wo, echo, HbA1c, lipid panel  - monitor on telemetry  - normothermia, euglycemia  - avoid hypotonic fluids.      Hang Goncalves MD

## 2024-05-03 ENCOUNTER — APPOINTMENT (OUTPATIENT)
Dept: MRI IMAGING | Facility: HOSPITAL | Age: 84
End: 2024-05-03
Payer: COMMERCIAL

## 2024-05-03 VITALS
OXYGEN SATURATION: 99 % | TEMPERATURE: 97.9 F | SYSTOLIC BLOOD PRESSURE: 119 MMHG | DIASTOLIC BLOOD PRESSURE: 67 MMHG | HEART RATE: 91 BPM | BODY MASS INDEX: 27.01 KG/M2 | RESPIRATION RATE: 17 BRPM | WEIGHT: 158.2 LBS | HEIGHT: 64 IN

## 2024-05-03 PROBLEM — R20.2 PARESTHESIA: Status: ACTIVE | Noted: 2024-05-03

## 2024-05-03 PROBLEM — R29.90 STROKE-LIKE SYMPTOM: Status: RESOLVED | Noted: 2022-02-18 | Resolved: 2024-05-03

## 2024-05-03 PROBLEM — I10 ESSENTIAL HYPERTENSION: Status: RESOLVED | Noted: 2022-02-18 | Resolved: 2024-05-03

## 2024-05-03 LAB
ANION GAP SERPL CALCULATED.3IONS-SCNC: 8 MMOL/L (ref 4–13)
ATRIAL RATE: 98 BPM
B BURGDOR IGG+IGM SER QL IA: NEGATIVE
BASOPHILS # BLD AUTO: 0.04 THOUSANDS/ÂΜL (ref 0–0.1)
BASOPHILS NFR BLD AUTO: 0 % (ref 0–1)
BUN SERPL-MCNC: 11 MG/DL (ref 5–25)
CALCIUM SERPL-MCNC: 9.2 MG/DL (ref 8.4–10.2)
CHLORIDE SERPL-SCNC: 103 MMOL/L (ref 96–108)
CHOLEST SERPL-MCNC: 143 MG/DL
CO2 SERPL-SCNC: 26 MMOL/L (ref 21–32)
CREAT SERPL-MCNC: 0.92 MG/DL (ref 0.6–1.3)
EOSINOPHIL # BLD AUTO: 0.12 THOUSAND/ÂΜL (ref 0–0.61)
EOSINOPHIL NFR BLD AUTO: 1 % (ref 0–6)
ERYTHROCYTE [DISTWIDTH] IN BLOOD BY AUTOMATED COUNT: 13.3 % (ref 11.6–15.1)
EST. AVERAGE GLUCOSE BLD GHB EST-MCNC: 128 MG/DL
GFR SERPL CREATININE-BSD FRML MDRD: 56 ML/MIN/1.73SQ M
GLUCOSE SERPL-MCNC: 166 MG/DL (ref 65–140)
GLUCOSE SERPL-MCNC: 182 MG/DL (ref 65–140)
HBA1C MFR BLD: 6.1 %
HCT VFR BLD AUTO: 36.9 % (ref 34.8–46.1)
HDLC SERPL-MCNC: 49 MG/DL
HGB BLD-MCNC: 12.6 G/DL (ref 11.5–15.4)
IMM GRANULOCYTES # BLD AUTO: 0.06 THOUSAND/UL (ref 0–0.2)
IMM GRANULOCYTES NFR BLD AUTO: 1 % (ref 0–2)
LDLC SERPL CALC-MCNC: 75 MG/DL (ref 0–100)
LYMPHOCYTES # BLD AUTO: 0.83 THOUSANDS/ÂΜL (ref 0.6–4.47)
LYMPHOCYTES NFR BLD AUTO: 8 % (ref 14–44)
MCH RBC QN AUTO: 29.6 PG (ref 26.8–34.3)
MCHC RBC AUTO-ENTMCNC: 34.1 G/DL (ref 31.4–37.4)
MCV RBC AUTO: 87 FL (ref 82–98)
MONOCYTES # BLD AUTO: 0.63 THOUSAND/ÂΜL (ref 0.17–1.22)
MONOCYTES NFR BLD AUTO: 6 % (ref 4–12)
NEUTROPHILS # BLD AUTO: 9.36 THOUSANDS/ÂΜL (ref 1.85–7.62)
NEUTS SEG NFR BLD AUTO: 84 % (ref 43–75)
NRBC BLD AUTO-RTO: 0 /100 WBCS
P AXIS: 27 DEGREES
PLATELET # BLD AUTO: 237 THOUSANDS/UL (ref 149–390)
PMV BLD AUTO: 10.9 FL (ref 8.9–12.7)
POTASSIUM SERPL-SCNC: 3.7 MMOL/L (ref 3.5–5.3)
PR INTERVAL: 184 MS
QRS AXIS: -45 DEGREES
QRSD INTERVAL: 90 MS
QT INTERVAL: 374 MS
QTC INTERVAL: 477 MS
RBC # BLD AUTO: 4.25 MILLION/UL (ref 3.81–5.12)
SODIUM SERPL-SCNC: 137 MMOL/L (ref 135–147)
T WAVE AXIS: 17 DEGREES
TRIGL SERPL-MCNC: 94 MG/DL
VENTRICULAR RATE: 98 BPM
WBC # BLD AUTO: 11.04 THOUSAND/UL (ref 4.31–10.16)

## 2024-05-03 PROCEDURE — 97163 PT EVAL HIGH COMPLEX 45 MIN: CPT

## 2024-05-03 PROCEDURE — 70551 MRI BRAIN STEM W/O DYE: CPT

## 2024-05-03 PROCEDURE — 80048 BASIC METABOLIC PNL TOTAL CA: CPT | Performed by: INTERNAL MEDICINE

## 2024-05-03 PROCEDURE — 97167 OT EVAL HIGH COMPLEX 60 MIN: CPT

## 2024-05-03 PROCEDURE — 83036 HEMOGLOBIN GLYCOSYLATED A1C: CPT | Performed by: INTERNAL MEDICINE

## 2024-05-03 PROCEDURE — 80061 LIPID PANEL: CPT | Performed by: INTERNAL MEDICINE

## 2024-05-03 PROCEDURE — 82948 REAGENT STRIP/BLOOD GLUCOSE: CPT

## 2024-05-03 PROCEDURE — 85025 COMPLETE CBC W/AUTO DIFF WBC: CPT | Performed by: INTERNAL MEDICINE

## 2024-05-03 PROCEDURE — 99239 HOSP IP/OBS DSCHRG MGMT >30: CPT | Performed by: FAMILY MEDICINE

## 2024-05-03 PROCEDURE — 97116 GAIT TRAINING THERAPY: CPT

## 2024-05-03 RX ORDER — ACETAMINOPHEN 325 MG/1
650 TABLET ORAL EVERY 6 HOURS PRN
Status: DISCONTINUED | OUTPATIENT
Start: 2024-05-03 | End: 2024-05-03 | Stop reason: HOSPADM

## 2024-05-03 RX ORDER — HYDROXYZINE HYDROCHLORIDE 25 MG/1
25 TABLET, FILM COATED ORAL EVERY 6 HOURS PRN
Status: DISCONTINUED | OUTPATIENT
Start: 2024-05-03 | End: 2024-05-03 | Stop reason: HOSPADM

## 2024-05-03 RX ADMIN — HYDROXYZINE HYDROCHLORIDE 25 MG: 25 TABLET ORAL at 07:19

## 2024-05-03 RX ADMIN — LOSARTAN POTASSIUM 50 MG: 50 TABLET, FILM COATED ORAL at 08:15

## 2024-05-03 RX ADMIN — ATORVASTATIN CALCIUM 40 MG: 40 TABLET, FILM COATED ORAL at 16:49

## 2024-05-03 RX ADMIN — AMLODIPINE BESYLATE 5 MG: 5 TABLET ORAL at 08:16

## 2024-05-03 RX ADMIN — ASPIRIN 81 MG: 81 TABLET, COATED ORAL at 08:15

## 2024-05-03 RX ADMIN — ENOXAPARIN SODIUM 40 MG: 40 INJECTION SUBCUTANEOUS at 08:16

## 2024-05-03 RX ADMIN — CLOPIDOGREL 75 MG: 75 TABLET ORAL at 08:15

## 2024-05-03 RX ADMIN — ACETAMINOPHEN 325MG 650 MG: 325 TABLET ORAL at 01:54

## 2024-05-03 NOTE — CONSULTS
Consult received for stroke pathway. Pt reports good appetite currently and at baseline. Reports not restricting her diet at home. Discussed lipid panel and A1c level with pt. Consider limiting sweetened beverages/concentrated desserts. Pt had no diet questions, enjoys the food offered at the hospital. Continue regular diet at this time. Consider CCD 1 restriction if elevated BG levels persist.

## 2024-05-03 NOTE — PLAN OF CARE
Problem: Neurological Deficit  Goal: Neurological status is stable or improving  Description: Interventions:  - Monitor and assess patient's level of consciousness, motor function, sensory function, and level of assistance needed for ADLs.   - Monitor and report changes from baseline. Collaborate with interdisciplinary team to initiate plan and implement interventions as ordered.   - Provide and maintain a safe environment.  - Consider seizure precautions.  - Consider fall precautions.  - Consider aspiration precautions.  - Consider bleeding precautions.  Outcome: Progressing     Problem: Activity Intolerance/Impaired Mobility  Goal: Mobility/activity is maintained at optimum level for patient  Description: Interventions:  - Assess and monitor patient  barriers to mobility and need for assistive/adaptive devices.  - Assess patient's emotional response to limitations.  - Collaborate with interdisciplinary team and initiate plans and interventions as ordered.  - Encourage independent activity per ability.  - Maintain proper body alignment.  - Perform active/passive rom as tolerated/ordered.  - Plan activities to conserve energy.  - Turn patient as appropriate  Outcome: Progressing     Problem: Communication Impairment  Goal: Ability to express needs and understand communication  Description: Assess patient's communication skills and ability to understand information.  Patient will demonstrate use of effective communication techniques, alternative methods of communication and understanding even if not able to speak.     - Encourage communication and provide alternate methods of communication as needed.  - Collaborate with case management/ for discharge needs.  - Include patient/family/caregiver in decisions related to communication.  Outcome: Progressing     Problem: Potential for Aspiration  Goal: Non-ventilated patient's risk of aspiration is minimized  Description: Assess and monitor vital signs,  respiratory status, and labs (WBC).  Monitor for signs of aspiration (tachypnea, cough, rales, wheezing, cyanosis, fever).    - Assess and monitor patient's ability to swallow.  - Place patient up in chair to eat if possible.  - HOB up at 90 degrees to eat if unable to get patient up into chair.  - Supervise patient during oral intake.   - Instruct patient/ family to take small bites.  - Instruct patient/ family to take small single sips when taking liquids.  - Follow patient-specific strategies generated by speech pathologist.  Outcome: Progressing  Goal: Ventilated patient's risk of aspiration is minimized  Description: Assess and monitor vital signs, respiratory status, airway cuff pressure, and labs (WBC).  Monitor for signs of aspiration (tachypnea, cough, rales, wheezing, cyanosis, fever).    - Elevate head of bed 30 degrees if patient has tube feeding.  - Monitor tube feeding.  Outcome: Progressing     Problem: Nutrition  Goal: Nutrition/Hydration status is improving  Description: Monitor and assess patient's nutrition/hydration status for malnutrition (ex- brittle hair, bruises, dry skin, pale skin and conjunctiva, muscle wasting, smooth red tongue, and disorientation). Collaborate with interdisciplinary team and initiate plan and interventions as ordered.  Monitor patient's weight and dietary intake as ordered or per policy. Utilize nutrition screening tool and intervene per policy. Determine patient's food preferences and provide high-protein, high-caloric foods as appropriate.     - Assist patient with eating.  - Allow adequate time for meals.  - Encourage patient to take dietary supplement as ordered.  - Collaborate with clinical nutritionist.  - Include patient/family/caregiver in decisions related to nutrition.  Outcome: Progressing     Problem: PAIN - ADULT  Goal: Verbalizes/displays adequate comfort level or baseline comfort level  Description: Interventions:  - Encourage patient to monitor pain and  request assistance  - Assess pain using appropriate pain scale  - Administer analgesics based on type and severity of pain and evaluate response  - Implement non-pharmacological measures as appropriate and evaluate response  - Consider cultural and social influences on pain and pain management  - Notify physician/advanced practitioner if interventions unsuccessful or patient reports new pain  Outcome: Progressing     Problem: INFECTION - ADULT  Goal: Absence or prevention of progression during hospitalization  Description: INTERVENTIONS:  - Assess and monitor for signs and symptoms of infection  - Monitor lab/diagnostic results  - Monitor all insertion sites, i.e. indwelling lines, tubes, and drains  - Monitor endotracheal if appropriate and nasal secretions for changes in amount and color  - [place] appropriate cooling/warming therapies per order  - Administer medications as ordered  - Instruct and encourage patient and family to use good hand hygiene technique  - Identify and instruct in appropriate isolation precautions for identified infection/condition  Outcome: Progressing  Goal: Absence of fever/infection during neutropenic period  Description: INTERVENTIONS:  - Monitor WBC    Outcome: Progressing     Problem: SAFETY ADULT  Goal: Patient will remain free of falls  Description: INTERVENTIONS:  - Educate patient/family on patient safety including physical limitations  - Instruct patient to call for assistance with activity   - Consult OT/PT to assist with strengthening/mobility   - Keep Call bell within reach  - Keep bed low and locked with side rails adjusted as appropriate  - Keep care items and personal belongings within reach  - Initiate and maintain comfort rounds  - Make Fall Risk Sign visible to staff  - Offer Toileting every 2 Hours, in advance of need  - Initiate/Maintain alarm  - Obtain necessary fall risk management equipment  - Apply yellow socks and bracelet for high fall risk patients  -  Consider moving patient to room near nurses station  Outcome: Progressing  Goal: Maintain or return to baseline ADL function  Description: INTERVENTIONS:  -  Assess patient's ability to carry out ADLs; assess patient's baseline for ADL function and identify physical deficits which impact ability to perform ADLs (bathing, care of mouth/teeth, toileting, grooming, dressing, etc.)  - Assess/evaluate cause of self-care deficits   - Assess range of motion  - Assess patient's mobility; develop plan if impaired  - Assess patient's need for assistive devices and provide as appropriate  - Encourage maximum independence but intervene and supervise when necessary  - Involve family in performance of ADLs  - Assess for home care needs following discharge   - Consider OT consult to assist with ADL evaluation and planning for discharge  - Provide patient education as appropriate  Outcome: Progressing  Goal: Maintains/Returns to pre admission functional level  Description: INTERVENTIONS:  - Perform AM-PAC 6 Click Basic Mobility/ Daily Activity assessment daily.  - Set and communicate daily mobility goal to care team and patient/family/caregiver.   - Collaborate with rehabilitation services on mobility goals if consulted  - Perform Range of Motion 3 times a day.  - Reposition patient every 2 hours.  - Dangle patient 3 times a day  - Stand patient 3 times a day  - Ambulate patient 3 times a day  - Out of bed to chair 3 times a day   - Out of bed for meals 3 times a day  - Out of bed for toileting  - Record patient progress and toleration of activity level   Outcome: Progressing     Problem: DISCHARGE PLANNING  Goal: Discharge to home or other facility with appropriate resources  Description: INTERVENTIONS:  - Identify barriers to discharge w/patient and caregiver  - Arrange for needed discharge resources and transportation as appropriate  - Identify discharge learning needs (meds, wound care, etc.)  - Arrange for interpretive  services to assist at discharge as needed  - Refer to Case Management Department for coordinating discharge planning if the patient needs post-hospital services based on physician/advanced practitioner order or complex needs related to functional status, cognitive ability, or social support system  Outcome: Progressing     Problem: Knowledge Deficit  Goal: Patient/family/caregiver demonstrates understanding of disease process, treatment plan, medications, and discharge instructions  Description: Complete learning assessment and assess knowledge base.  Interventions:  - Provide teaching at level of understanding  - Provide teaching via preferred learning methods  Outcome: Progressing     Problem: NEUROSENSORY - ADULT  Goal: Achieves stable or improved neurological status  Description: INTERVENTIONS  - Monitor and report changes in neurological status  - Monitor vital signs such as temperature, blood pressure, glucose, and any other labs ordered   - Initiate measures to prevent increased intracranial pressure  - Monitor for seizure activity and implement precautions if appropriate      Outcome: Progressing  Goal: Remains free of injury related to seizures activity  Description: INTERVENTIONS  - Maintain airway, patient safety  and administer oxygen as ordered  - Monitor patient for seizure activity, document and report duration and description of seizure to physician/advanced practitioner  - If seizure occurs,  ensure patient safety during seizure  - Reorient patient post seizure  - Seizure pads on all 4 side rails  - Instruct patient/family to notify RN of any seizure activity including if an aura is experienced  - Instruct patient/family to call for assistance with activity based on nursing assessment  - Administer anti-seizure medications if ordered    Outcome: Progressing  Goal: Achieves maximal functionality and self care  Description: INTERVENTIONS  - Monitor swallowing and airway patency with patient fatigue  and changes in neurological status  - Encourage and assist patient to increase activity and self care.   - Encourage visually impaired, hearing impaired and aphasic patients to use assistive/communication devices  Outcome: Progressing     Problem: CARDIOVASCULAR - ADULT  Goal: Maintains optimal cardiac output and hemodynamic stability  Description: INTERVENTIONS:  - Monitor I/O, vital signs and rhythm  - Monitor for S/S and trends of decreased cardiac output  - Administer and titrate ordered vasoactive medications to optimize hemodynamic stability  - Assess quality of pulses, skin color and temperature  - Assess for signs of decreased coronary artery perfusion  - Instruct patient to report change in severity of symptoms  Outcome: Progressing  Goal: Absence of cardiac dysrhythmias or at baseline rhythm  Description: INTERVENTIONS:  - Continuous cardiac monitoring, vital signs, obtain 12 lead EKG if ordered  - Administer antiarrhythmic and heart rate control medications as ordered  - Monitor electrolytes and administer replacement therapy as ordered  Outcome: Progressing     Problem: RESPIRATORY - ADULT  Goal: Achieves optimal ventilation and oxygenation  Description: INTERVENTIONS:  - Assess for changes in respiratory status  - Assess for changes in mentation and behavior  - Position to facilitate oxygenation and minimize respiratory effort  - Oxygen administered by appropriate delivery if ordered  - Initiate smoking cessation education as indicated  - Encourage broncho-pulmonary hygiene including cough, deep breathe, Incentive Spirometry  - Assess the need for suctioning and aspirate as needed  - Assess and instruct to report SOB or any respiratory difficulty  - Respiratory Therapy support as indicated  Outcome: Progressing     Problem: METABOLIC, FLUID AND ELECTROLYTES - ADULT  Goal: Electrolytes maintained within normal limits  Description: INTERVENTIONS:  - Monitor labs and assess patient for signs and symptoms  of electrolyte imbalances  - Administer electrolyte replacement as ordered  - Monitor response to electrolyte replacements, including repeat lab results as appropriate  - Instruct patient on fluid and nutrition as appropriate  Outcome: Progressing  Goal: Fluid balance maintained  Description: INTERVENTIONS:  - Monitor labs   - Monitor I/O and WT  - Instruct patient on fluid and nutrition as appropriate  - Assess for signs & symptoms of volume excess or deficit  Outcome: Progressing  Goal: Glucose maintained within target range  Description: INTERVENTIONS:  - Monitor Blood Glucose as ordered  - Assess for signs and symptoms of hyperglycemia and hypoglycemia  - Administer ordered medications to maintain glucose within target range  - Assess nutritional intake and initiate nutrition service referral as needed  Outcome: Progressing     Problem: HEMATOLOGIC - ADULT  Goal: Maintains hematologic stability  Description: INTERVENTIONS  - Assess for signs and symptoms of bleeding or hemorrhage  - Monitor labs  - Administer supportive blood products/factors as ordered and appropriate  Outcome: Progressing     Problem: MUSCULOSKELETAL - ADULT  Goal: Maintain or return mobility to safest level of function  Description: INTERVENTIONS:  - Assess patient's ability to carry out ADLs; assess patient's baseline for ADL function and identify physical deficits which impact ability to perform ADLs (bathing, care of mouth/teeth, toileting, grooming, dressing, etc.)  - Assess/evaluate cause of self-care deficits   - Assess range of motion  - Assess patient's mobility  - Assess patient's need for assistive devices and provide as appropriate  - Encourage maximum independence but intervene and supervise when necessary  - Involve family in performance of ADLs  - Assess for home care needs following discharge   - Consider OT consult to assist with ADL evaluation and planning for discharge  - Provide patient education as appropriate  Outcome:  Progressing  Goal: Maintain proper alignment of affected body part  Description: INTERVENTIONS:  - Support, maintain and protect limb and body alignment  - Provide patient/ family with appropriate education  Outcome: Progressing

## 2024-05-03 NOTE — PLAN OF CARE
Problem: Neurological Deficit  Goal: Neurological status is stable or improving  Description: Interventions:  - Monitor and assess patient's level of consciousness, motor function, sensory function, and level of assistance needed for ADLs.   - Monitor and report changes from baseline. Collaborate with interdisciplinary team to initiate plan and implement interventions as ordered.   - Provide and maintain a safe environment.  - Consider seizure precautions.  - Consider fall precautions.  - Consider aspiration precautions.  - Consider bleeding precautions.  Outcome: Progressing     Problem: Activity Intolerance/Impaired Mobility  Goal: Mobility/activity is maintained at optimum level for patient  Description: Interventions:  - Assess and monitor patient  barriers to mobility and need for assistive/adaptive devices.  - Assess patient's emotional response to limitations.  - Collaborate with interdisciplinary team and initiate plans and interventions as ordered.  - Encourage independent activity per ability.  - Maintain proper body alignment.  - Perform active/passive rom as tolerated/ordered.  - Plan activities to conserve energy.  - Turn patient as appropriate  Outcome: Progressing     Problem: Communication Impairment  Goal: Ability to express needs and understand communication  Description: Assess patient's communication skills and ability to understand information.  Patient will demonstrate use of effective communication techniques, alternative methods of communication and understanding even if not able to speak.     - Encourage communication and provide alternate methods of communication as needed.  - Collaborate with case management/ for discharge needs.  - Include patient/family/caregiver in decisions related to communication.  Outcome: Progressing     Problem: Potential for Aspiration  Goal: Non-ventilated patient's risk of aspiration is minimized  Description: Assess and monitor vital signs,  respiratory status, and labs (WBC).  Monitor for signs of aspiration (tachypnea, cough, rales, wheezing, cyanosis, fever).    - Assess and monitor patient's ability to swallow.  - Place patient up in chair to eat if possible.  - HOB up at 90 degrees to eat if unable to get patient up into chair.  - Supervise patient during oral intake.   - Instruct patient/ family to take small bites.  - Instruct patient/ family to take small single sips when taking liquids.  - Follow patient-specific strategies generated by speech pathologist.  Outcome: Progressing  Goal: Ventilated patient's risk of aspiration is minimized  Description: Assess and monitor vital signs, respiratory status, airway cuff pressure, and labs (WBC).  Monitor for signs of aspiration (tachypnea, cough, rales, wheezing, cyanosis, fever).    - Elevate head of bed 30 degrees if patient has tube feeding.  - Monitor tube feeding.  Outcome: Progressing     Problem: Nutrition  Goal: Nutrition/Hydration status is improving  Description: Monitor and assess patient's nutrition/hydration status for malnutrition (ex- brittle hair, bruises, dry skin, pale skin and conjunctiva, muscle wasting, smooth red tongue, and disorientation). Collaborate with interdisciplinary team and initiate plan and interventions as ordered.  Monitor patient's weight and dietary intake as ordered or per policy. Utilize nutrition screening tool and intervene per policy. Determine patient's food preferences and provide high-protein, high-caloric foods as appropriate.     - Assist patient with eating.  - Allow adequate time for meals.  - Encourage patient to take dietary supplement as ordered.  - Collaborate with clinical nutritionist.  - Include patient/family/caregiver in decisions related to nutrition.  Outcome: Progressing     Problem: PAIN - ADULT  Goal: Verbalizes/displays adequate comfort level or baseline comfort level  Description: Interventions:  - Encourage patient to monitor pain and  request assistance  - Assess pain using appropriate pain scale  - Administer analgesics based on type and severity of pain and evaluate response  - Implement non-pharmacological measures as appropriate and evaluate response  - Consider cultural and social influences on pain and pain management  - Notify physician/advanced practitioner if interventions unsuccessful or patient reports new pain  Outcome: Progressing     Problem: INFECTION - ADULT  Goal: Absence or prevention of progression during hospitalization  Description: INTERVENTIONS:  - Assess and monitor for signs and symptoms of infection  - Monitor lab/diagnostic results  - Monitor all insertion sites, i.e. indwelling lines, tubes, and drains  - Monitor endotracheal if appropriate and nasal secretions for changes in amount and color  - [place] appropriate cooling/warming therapies per order  - Administer medications as ordered  - Instruct and encourage patient and family to use good hand hygiene technique  - Identify and instruct in appropriate isolation precautions for identified infection/condition  Outcome: Progressing     Problem: SAFETY ADULT  Goal: Patient will remain free of falls  Description: INTERVENTIONS:  - Educate patient/family on patient safety including physical limitations  - Instruct patient to call for assistance with activity   - Consult OT/PT to assist with strengthening/mobility   - Keep Call bell within reach  - Keep bed low and locked with side rails adjusted as appropriate  - Keep care items and personal belongings within reach  - Initiate and maintain comfort rounds  - Make Fall Risk Sign visible to staff    - Initiate/Maintain bed alarm  - Obtain necessary fall risk management equipment: bed alarm  - Apply yellow socks and bracelet for high fall risk patients  - Consider moving patient to room near nurses station  Outcome: Progressing  Goal: Maintain or return to baseline ADL function  Description: INTERVENTIONS:  -  Assess  patient's ability to carry out ADLs; assess patient's baseline for ADL function and identify physical deficits which impact ability to perform ADLs (bathing, care of mouth/teeth, toileting, grooming, dressing, etc.)  - Assess/evaluate cause of self-care deficits   - Assess range of motion  - Assess patient's mobility; develop plan if impaired  - Assess patient's need for assistive devices and provide as appropriate  - Encourage maximum independence but intervene and supervise when necessary  - Involve family in performance of ADLs  - Assess for home care needs following discharge   - Consider OT consult to assist with ADL evaluation and planning for discharge  - Provide patient education as appropriate  Outcome: Progressing  Goal: Maintains/Returns to pre admission functional level  Description: INTERVENTIONS:  - Perform AM-PAC 6 Click Basic Mobility/ Daily Activity assessment daily.  - Set and communicate daily mobility goal to care team and patient/family/caregiver.   - Collaborate with rehabilitation services on mobility goals if consulted    - Out of bed for toileting  - Record patient progress and toleration of activity level   Outcome: Progressing     Problem: DISCHARGE PLANNING  Goal: Discharge to home or other facility with appropriate resources  Description: INTERVENTIONS:  - Identify barriers to discharge w/patient and caregiver  - Arrange for needed discharge resources and transportation as appropriate  - Identify discharge learning needs (meds, wound care, etc.)  - Arrange for interpretive services to assist at discharge as needed  - Refer to Case Management Department for coordinating discharge planning if the patient needs post-hospital services based on physician/advanced practitioner order or complex needs related to functional status, cognitive ability, or social support system  Outcome: Progressing     Problem: Knowledge Deficit  Goal: Patient/family/caregiver demonstrates understanding of disease  process, treatment plan, medications, and discharge instructions  Description: Complete learning assessment and assess knowledge base.  Interventions:  - Provide teaching at level of understanding  - Provide teaching via preferred learning methods  Outcome: Progressing     Problem: NEUROSENSORY - ADULT  Goal: Achieves stable or improved neurological status  Description: INTERVENTIONS  - Monitor and report changes in neurological status  - Monitor vital signs such as temperature, blood pressure, glucose, and any other labs ordered   - Initiate measures to prevent increased intracranial pressure  - Monitor for seizure activity and implement precautions if appropriate      Outcome: Progressing  Goal: Remains free of injury related to seizures activity  Description: INTERVENTIONS  - Maintain airway, patient safety  and administer oxygen as ordered  - Monitor patient for seizure activity, document and report duration and description of seizure to physician/advanced practitioner  - If seizure occurs,  ensure patient safety during seizure  - Reorient patient post seizure  - Seizure pads on all 4 side rails  - Instruct patient/family to notify RN of any seizure activity including if an aura is experienced  - Instruct patient/family to call for assistance with activity based on nursing assessment  - Administer anti-seizure medications if ordered    Outcome: Progressing  Goal: Achieves maximal functionality and self care  Description: INTERVENTIONS  - Monitor swallowing and airway patency with patient fatigue and changes in neurological status  - Encourage and assist patient to increase activity and self care.   - Encourage visually impaired, hearing impaired and aphasic patients to use assistive/communication devices  Outcome: Progressing     Problem: CARDIOVASCULAR - ADULT  Goal: Maintains optimal cardiac output and hemodynamic stability  Description: INTERVENTIONS:  - Monitor I/O, vital signs and rhythm  - Monitor for S/S  and trends of decreased cardiac output  - Administer and titrate ordered vasoactive medications to optimize hemodynamic stability  - Assess quality of pulses, skin color and temperature  - Assess for signs of decreased coronary artery perfusion  - Instruct patient to report change in severity of symptoms  Outcome: Progressing  Goal: Absence of cardiac dysrhythmias or at baseline rhythm  Description: INTERVENTIONS:  - Continuous cardiac monitoring, vital signs, obtain 12 lead EKG if ordered  - Administer antiarrhythmic and heart rate control medications as ordered  - Monitor electrolytes and administer replacement therapy as ordered  Outcome: Progressing     Problem: RESPIRATORY - ADULT  Goal: Achieves optimal ventilation and oxygenation  Description: INTERVENTIONS:  - Assess for changes in respiratory status  - Assess for changes in mentation and behavior  - Position to facilitate oxygenation and minimize respiratory effort  - Oxygen administered by appropriate delivery if ordered  - Initiate smoking cessation education as indicated  - Encourage broncho-pulmonary hygiene including cough, deep breathe, Incentive Spirometry  - Assess the need for suctioning and aspirate as needed  - Assess and instruct to report SOB or any respiratory difficulty  - Respiratory Therapy support as indicated  Outcome: Progressing     Problem: METABOLIC, FLUID AND ELECTROLYTES - ADULT  Goal: Electrolytes maintained within normal limits  Description: INTERVENTIONS:  - Monitor labs and assess patient for signs and symptoms of electrolyte imbalances  - Administer electrolyte replacement as ordered  - Monitor response to electrolyte replacements, including repeat lab results as appropriate  - Instruct patient on fluid and nutrition as appropriate  Outcome: Progressing  Goal: Fluid balance maintained  Description: INTERVENTIONS:  - Monitor labs   - Monitor I/O and WT  - Instruct patient on fluid and nutrition as appropriate  - Assess for signs  & symptoms of volume excess or deficit  Outcome: Progressing  Goal: Glucose maintained within target range  Description: INTERVENTIONS:  - Monitor Blood Glucose as ordered  - Assess for signs and symptoms of hyperglycemia and hypoglycemia  - Administer ordered medications to maintain glucose within target range  - Assess nutritional intake and initiate nutrition service referral as needed  Outcome: Progressing     Problem: HEMATOLOGIC - ADULT  Goal: Maintains hematologic stability  Description: INTERVENTIONS  - Assess for signs and symptoms of bleeding or hemorrhage  - Monitor labs  - Administer supportive blood products/factors as ordered and appropriate  Outcome: Progressing     Problem: MUSCULOSKELETAL - ADULT  Goal: Maintain or return mobility to safest level of function  Description: INTERVENTIONS:  - Assess patient's ability to carry out ADLs; assess patient's baseline for ADL function and identify physical deficits which impact ability to perform ADLs (bathing, care of mouth/teeth, toileting, grooming, dressing, etc.)  - Assess/evaluate cause of self-care deficits   - Assess range of motion  - Assess patient's mobility  - Assess patient's need for assistive devices and provide as appropriate  - Encourage maximum independence but intervene and supervise when necessary  - Involve family in performance of ADLs  - Assess for home care needs following discharge   - Consider OT consult to assist with ADL evaluation and planning for discharge  - Provide patient education as appropriate  Outcome: Progressing  Goal: Maintain proper alignment of affected body part  Description: INTERVENTIONS:  - Support, maintain and protect limb and body alignment  - Provide patient/ family with appropriate education  Outcome: Progressing

## 2024-05-03 NOTE — OCCUPATIONAL THERAPY NOTE
Occupational Therapy Evaluation     Patient Name: Dayan Joshi  Today's Date: 5/3/2024  Problem List  Principal Problem:    Stroke-like symptoms  Active Problems:    Paresthesia    Past Medical History  Past Medical History:   Diagnosis Date    Hypertension     Psoriasis     Stroke (HCC)      Past Surgical History  History reviewed. No pertinent surgical history.        05/03/24 1520   Note Type   Note type Evaluation   Pain Assessment   Pain Assessment Tool FLACC   Pain Location/Orientation Orientation: Left;Location: Neck;Other (Comment)  (Hand)   Pain Onset/Description   (6/10 pain in hand, pain in neck is less severe and only with  movement)   Pain Rating: FLACC (Rest) - Face 0   Pain Rating: FLACC (Rest) - Legs 0   Pain Rating: FLACC (Rest) - Activity 0   Pain Rating: FLACC (Rest) - Cry 0   Pain Rating: FLACC (Rest) - Consolability 0   Score: FLACC (Rest) 0   Pain Rating: FLACC (Activity) - Face 1   Pain Rating: FLACC (Activity) - Legs 0   Pain Rating: FLACC (Activity) - Activity 0   Pain Rating: FLACC (Activity) - Cry 1   Pain Rating: FLACC (Activity) - Consolability 0   Score: FLACC (Activity) 2   Restrictions/Precautions   Other Precautions Chair Alarm;Bed Alarm;Fall Risk;Pain   Home Living   Type of Home House  (1 ANALY B HRs)   Home Layout Two level;Able to live on main level with bedroom/bathroom  (Full bathroom on first floor. Bedroom upstairs. FF to bedroom B HRs)   Bathroom Shower/Tub Tub/shower unit  (Non slip mat)   Bathroom Toilet Raised   Bathroom Equipment Grab bars in shower   Home Equipment Walker;Cane   Additional Comments Pt reports living in a 2SH with 1 ANALY and wasn't using AD for functional mobility PTA.   Prior Function   Level of [place] Independent with ADLs;Independent with functional mobility;Independent with IADLS   Lives With Alone   Receives Help From Family   IADLs Independent with driving;Independent with medication management;Independent with meal prep  (Pt can prepare  light meals, family usually brings her meals but she can heat them up)   Falls in the last 6 months 0   Comments PTA, pt reports independence with ADLs, IADLs, and functional mobility without AD.   General   Family/Caregiver Present Yes   ADL   UB Dressing Assistance 5  Supervision/Setup   LB Dressing Assistance 5  Supervision/Setup   LB Dressing Deficit Don/doff R sock;Don/doff L sock   Additional Comments Pt able to doff/don the R sock while seated in chair. Overall, UB/LB ADLs with setup and increased time.   Bed Mobility   Additional Comments Not assessed, pt received sitting OOB in recliner chair upon start of session.   Transfers   Sit to Stand 5  Supervision   Stand to Sit 5  Supervision   Stand pivot   (SBA)   Additional items Increased time required;Verbal cues   Additional Comments All functional transfers initially without AD. Pt sit <> stand with supervision. Pt spt in room with SBA for balance.   Functional Mobility   Functional Mobility   (SBA--->supervision)   Additional Comments Pt participated in long functional household distance in room and in hallway at SBA. Initiated use of RW, and pt progresses to supervision level. At end of session, pt seated in chair with chair alarm on, call bell in reach, and all needs met.   Balance   Static Sitting Good   Dynamic Sitting Fair +   Static Standing Fair   Dynamic Standing Fair -   Activity Tolerance   Activity Tolerance   (Pt limited by visual deficits (that she reports is baseline) and L hand strength/coordination deficits)   Medical Staff Made Aware PTCesar   Nurse Made Aware Pooja IZQUIERDO Assessment   RUE Assessment WFL   LUE Assessment   LUE Assessment WFL   Hand Function   Gross Motor Coordination Functional   Fine Motor Coordination   (R functional, L impaired)   Hand Function Comments Pt is R hand dominant. Decreased  strength on the L as compared to the R. Pt unable to form a fully closed fist. Pt with reports of pain volar L hand that  "she describes as aching. Tinels over median nerve (-).   Sensation   Light Touch No apparent deficits   Vision-Basic Assessment   Current Vision   (Pt reporting her vision was \"never good\". She describes it as fuzzy. She has glasses for reading. 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 Based on visual deficits explained above, pt should not be driving)   Cognition   Overall Cognitive Status WFL   Arousal/Participation Alert;Cooperative   Attention Within functional limits   Orientation Level Oriented X4   Memory Unable to assess   Following Commands Follows one step commands without difficulty   Comments Questionable higher level cognitive deficits   Assessment   Limitation Decreased ADL status;Decreased UE strength;Decreased Safe judgement during ADL;Decreased cognition;Decreased endurance;Decreased self-care trans;Decreased high-level ADLs;Decreased fine motor control;Visual deficit   Prognosis Good   Assessment Pt is a 83 y.o. female, admitted to [facility]/2/[facility]24 d/t experiencing HTN. Dx: Stroke-like symptoms. Pt with PMHx impacting their performance during ADL tasks, including: HTN, stroke, psoriasis. Prior to admission to the hospital Pt was performing ADLs without physical assistance. IADLs without physical assistance. Functional transfers/ambulation without physical assistance. Cognitive status PTA was WFL. OT order placed to assess Pt's ADLs, cognitive status, and performance during functional tasks in order to maximize safety and independence while making most appropriate d/c recommendations. PT/OT co-evaluation completed at this time d/t significant mobility deficits and safety concerns. Pt's clinical presentation is currently unstable/unpredictable given new onset deficits that " affect Pt's occupational performance and ability to safely return to PLOF including decrease activity tolerance, decrease standing balance, decrease performance during ADL tasks, decrease cognition, decrease UB MS, increased pain, decrease activity engagement, decrease performance during functional transfers, decrease FM control, limited family support, and limited insight to deficits combined with medical complications of hypertension , abnormal CBC, abnormal blood sugars, and need for input for mobility technique/safety. Personal factors affecting Pt at time of initial evaluation include: multi-level environment, limited home support, advanced age, inability to navigate community distances, limited insight into impairments, and decreased initiation and engagement. Pt will benefit from continued skilled OT services to address deficits as defined above and to maximize level independence/participation during ADLs and functional tasks to facilitate return toward PLOF and improved quality of life. From an occupational therapy standpoint, Level III (Minimum Resource Intensity) is recommended upon d/c.   Goals   Patient Goals to go home   Plan   Treatment Interventions ADL retraining;Visual perceptual retraining;Functional transfer training;UE strengthening/ROM;Endurance training;Cognitive reorientation;Patient/family training;Equipment evaluation/education;Neuromuscular reeducation;Fine motor coordination activities;Compensatory technique education;Continued evaluation;Energy conservation;Activityengagement   Goal Expiration Date 05/17/24   OT Frequency 2-3x/wk   Discharge Recommendation   Rehab Resource Intensity Level, OT (S)  III (Minimum Resource Intensity)  (Would benefit from OP neurology consult, EMG, MRI of cervical spine- then OP OT as appropriate. Continued use of RW upon d/c to home, continued family support)   AM-PAC Daily Activity Inpatient   Lower Body Dressing 3   Bathing 3   Toileting 3   Upper Body  Dressing 3   Grooming 3   Eating 4   Daily Activity Raw Score 19   Daily Activity Standardized Score (Calc for Raw Score >=11) 40.22   AM-PAC Applied Cognition Inpatient   Following a Speech/Presentation 3   Understanding Ordinary Conversation 4   Taking Medications 3   Remembering Where Things Are Placed or Put Away 3   Remembering List of 4-5 Errands 3   Taking Care of Complicated Tasks 2   Applied Cognition Raw Score 18   Applied Cognition Standardized Score 38.07     The patient's raw score on the AM-PAC Daily Activity Inpatient Short Form is 19. A raw score of greater than or equal to 19 suggests the patient may benefit from discharge to home. Please refer to the recommendation of the Occupational Therapist for safe discharge planning.    Pt goals to be met by 5/17/2024:    Pt will demonstrate ability to complete grooming/hygiene tasks @ mod I after set-up.  Pt will demonstrate ability to complete supine<>sit @ mod I in order to increase safety and independence during ADL tasks.  Pt will demonstrate ability to complete UB ADLs including washing/dressing @ mod I in order to increase performance and participation during meaningful tasks  Pt will demonstrate ability to complete LB dressing @ mod I in order to increase safety and independence during meaningful tasks.   Pt will demonstrate ability to brian/doff socks/shoes while sitting EOB/chair @ mod I in order to increase safety and independence during meaningful tasks.   Pt will demonstrate ability to complete toileting tasks including CM and pericare @ mod I in order to increase safety and independence during meaningful tasks.  Pt will demonstrate ability to complete EOB, chair, toilet/commode transfers @ mod I in order to increase performance and participation during functional tasks.  Pt will demonstrate ability to stand for 10 minutes while maintaining F+ balance with use of RW for UB support PRN.  Pt will demonstrate ability to tolerate 20 minute OT session  with no vc'ing for deep breathing or use of energy conservation techniques in order to increase activity tolerance during functional tasks.   Pt will demonstrate Good carryover of use of energy conservation/compensatory strategies during ADLs and functional tasks in order to increase safety and reduce risk for falls.   Pt will demonstrate Good attention and participation in continued evaluation of functional ambulation house hold distances in order to assist with safe d/c planning.  Pt will attend to continued cognitive assessments 100% of the time in order to provide most appropriate d/c recommendations.   Pt will follow 100% simple 2-step commands and be A&O x4 consistently with environmental cues to increase participation in functional activities.   Pt will identify 3 areas of interest/hobbies and 1 intervention on how to incorporate into daily life in order to increase interaction with environment and peers as well as increase participation in meaningful tasks.   Pt will demonstrate 100% carryover of BUE HEP in order to increase BUE MS and increase performance during functional tasks upon d/c home.    Vickey Culver, MS, OTR/L

## 2024-05-03 NOTE — ASSESSMENT & PLAN NOTE
Affecting left upper extremity, MRI of the brain negative for stroke  Recommending to follow-up with neurology outpatient for basis for further workup.

## 2024-05-03 NOTE — PHYSICAL THERAPY NOTE
PHYSICAL THERAPY EVALUATION      NAME:  Dayan Joshi  DATE: 05/03/24    AGE:   83 y.o.  Mrn:   [id number]  ADMIT DX:  Hypertension [I10]  Stroke-like symptom [R29.90]    Past Medical History:   Diagnosis Date    Hypertension     Psoriasis     Stroke (HCC)      Length Of Stay: 0  Performed at least 2 patient identifiers during session: Name and Birthday        PHYSICAL THERAPY EVALUATION:       05/03/24 1521   PT Last Visit   PT Visit Date 05/03/24   Note Type   Note type Evaluation   Pain Assessment   Pain Assessment Tool 0-10   Pain Score 6   Pain Location/Orientation Orientation: Left  (Hand as well as L neck)   Pain Onset/Description Descriptor: Aching   Restrictions/Precautions   Weight Bearing Precautions Per Order No   Other Precautions Chair Alarm;Bed Alarm;Fall Risk;Pain;Visual impairment   Home Living   Type of Home House   Home Layout Two level  (full bath on 1st flr; bedroom on 2nd flr; FF stairs to bedroom with B rails)   Bathroom Shower/Tub Tub/shower unit  (non-slip mat)   Bathroom Toilet Raised   Home Equipment Walker;Cane   Prior Function   Level of [place] Independent with ADLs;Independent with functional mobility;Independent with IADLS   Lives With Alone  (with supportive family close by)   Receives Help From Family   IADLs Independent with driving;Independent with meal prep;Independent with medication management  (light meals)   Falls in the last 6 months 0   Comments IND without AD   General   Additional Pertinent History reports her vision has been bad for yrs.  also noted to have increased muscle tightness/tension on L side of her neck.   Family/Caregiver Present Yes   Cognition   Arousal/Participation Cooperative   Orientation Level Oriented X4   Following Commands Follows one step commands without difficulty   RLE Assessment   RLE Assessment WFL   LLE Assessment   LLE Assessment WFL   Light Touch   RLE Light Touch Grossly intact   LLE Light Touch Grossly intact   Proprioception    RLE Proprioception Grossly intact   LLE Proprioception Grossly Intact   Transfers   Sit to Stand 5  Supervision   Stand to Sit 5  Supervision   Stand pivot   (SBA)   Additional items Verbal cues   Additional Comments No AD   Ambulation/Elevation   Gait pattern Improper Weight shift;Inconsistent shantelle;Redundant gait at times;Narrow YOUSUF  (slight LOBs)   Gait Assistance   (SBA)   Additional items Verbal cues   Assistive Device None   Distance 210 ft   Stair Management Assistance 5  Supervision   Additional items Verbal cues   Stair Management Technique   (single railing)   Number of Stairs 10   Balance   Static Sitting Normal   Dynamic Sitting Good   Static Standing Fair +   Dynamic Standing Fair -   Ambulatory Fair -   Activity Tolerance   Activity Tolerance Patient tolerated treatment well   Medical Staff Made Aware OT Radha; MD   Nurse Made Aware Yes   Assessment   Prognosis Good   Problem List Decreased strength;Impaired balance;Decreased mobility;Obesity;Impaired vision   Barriers to Discharge None   Barriers to Discharge Comments pt with good/close family support   Goals   Patient Goals go home today   STG Expiration Date 05/17/24   PT Treatment Day 1   Plan   Treatment/Interventions Functional transfer training;LE strengthening/ROM;Elevations;Therapeutic exercise;Patient/family training;Gait training;Equipment eval/education;Compensatory technique education;OT;Spoke to case management;Spoke to nursing;Spoke to MD   PT Frequency 2-3x/wk   Discharge Recommendation   Rehab Resource Intensity Level, PT III (Minimum Resource Intensity)   Additional Comments using RW - pt owns   Additional Comments 2 pt should likely not be driving due to her impaired vision   AM-PAC Basic Mobility Inpatient   Turning in Flat Bed Without Bedrails 4   Lying on Back to Sitting on Edge of Flat Bed Without Bedrails 4   Moving Bed to Chair 3   Standing Up From Chair Using Arms 3   Walk in Room 3   Climb 3-5 Stairs With Railing 3    Basic Mobility Inpatient Raw Score 20   Basic Mobility Standardized Score 43.99   [facility] Highest Level Of Mobility   -HL Goal 6: Walk 10 steps or more   -HLM Achieved 8: Walk 250 feet or more   Additional Treatment Session   Start Time 1547   End Time 1555   Treatment Assessment Pt tolerates tx session well.  Interventions consisting of transfer and gait training with use of RW along with pt/family education.  Pt progressing to SPV level with use of RW, walking 200 ft with RW SPV.  Much improved gait quality and gait speed using RW as compared to no AD, indicating reduction in fall risk.  No c/o SOB, dizziness, or feeling lightheaded throughout.   End of Consult   Patient Position at End of Consult Bedside chair;Bed/Chair alarm activated;All needs within reach  (family present)     (Please find full objective findings from PT assessment regarding body systems outlined above).     Assessment: Pt is a 83 y.o. female seen for PT evaluation s/p admission to [facility] on 5/2/2024 with Stroke-like symptoms.  Order placed for PT services.  Upon evaluation: Pt is presenting with impaired functional mobility due to decreased strength, impaired balance, gait deviations, decreased safety awareness, visual impairment, and fall risk requiring  stand-by assistance for transfers and ambulation with no AD . Pt's clinical presentation is currently unstable/unpredictable given the functional mobility deficits above, especially impaired balance, coupled with fall risks as indicated by AM-PAC 6-Clicks: 20/24 as well as obesity and combined with medical complications of abnormal WBCs and need for input for mobility technique/safety.  Pt's PMHx and comorbidities that may affect physical performance and progress include: HTN and CVA in the past? . Personal factors affecting pt at time of IE include: step(s) to enter environment, multi-level environment, advanced age, inability to perform IADLs, and limited  insight into impairments. Pt will benefit from continued skilled PT services to address deficits as defined above and to maximize level of functional mobility to facilitate return toward PLOF and improved QOL. From PT/mobility standpoint, recommendation at time of d/c would be Level III (Minimum Resource Intensity) and with walker pending progress in order to reduce fall risk and maximize pt's functional independence and consistency with mobility in order to facilitate return to PLOF.  Recommend trial with walker next 1-2 sessions, ther ex next 1-2 sessions, and stair negotiation .     The patient's AM-PAC Basic Mobility Inpatient Short Form Raw Score is 20. A Raw score of greater than 16 suggests the patient may benefit from discharge to home. Please also refer to the recommendation of the Physical Therapist for safe discharge planning.       Goals: Pt will: Perform bed mobility tasks with modified I to reposition in bed and prepare for transfers. Pt will perform transfers with modified I to increase Indep in home environment and prepare for ambulation. Pt will ambulate with RW for >/= 250 ft with  modified I  to decrease risk for falls, improve gait quality, and promote proper use of assistive device and to access home environment. Pt will complete >/= 12 steps with with bilateral handrails with modified I to return to home with ANALY and return to multilevel home. Pt will participate in objective balance assessment to determine baseline fall risk.        Cesar Boyer, PT,DPT

## 2024-05-03 NOTE — UTILIZATION REVIEW
Initial Clinical Review    Admission: Date/Time/Statement:   Admission Orders (From admission, onward)       Ordered        05/02/24 1953  Place in Observation  Once                          Orders Placed This Encounter   Procedures    Place in Observation     Standing Status:   Standing     Number of Occurrences:   1     Order Specific Question:   Level of Care     Answer:   Med Surg [16]     ED Arrival Information       Expected   -    Arrival   5/2/2024 16:57    Acuity   Emergent              Means of arrival   Walk-In    Escorted by   Family Member    Service   Hospitalist    Admission type   Emergency              Arrival complaint   HBP, SOB             Chief Complaint   Patient presents with    Hypertension     Patient c/o high blood pressure and joint pain mostly in right shoulder, both knees and left hand.        Initial Presentation: 83 y.o. female to ED via walk-in from home  Present to ED with complaints of left sided weakness. Patient reports she woke up this morning and noted her left arm was weak.  Endorses left leg weakness associated with difficulty walking.   PMHX: HTN   Admitted to OBS with DX: Stroke-like symptom   on exam: hypertensive; tachypnea; B/L LE edema; left had  is weak, left UE motor is 4/5  PLAN: neuro checks; tele monitoring; monitor labs; f/u MRI brain; neuro consult; f/u lipid panel, hba1c; cont asa; start plavix / statin; monitor / trend BP's; PT/ OT eval - tx           ED Triage Vitals   Temperature Pulse Respirations Blood Pressure SpO2   05/02/24 1705 05/02/24 1705 05/02/24 1705 05/02/24 1705 05/02/24 1705   98.2 °F (36.8 °C) 89 18 (!) 232/106 97 %      Temp Source Heart Rate Source Patient Position - Orthostatic VS BP Location FiO2 (%)   05/02/24 1705 05/02/24 1705 05/02/24 1705 05/02/24 1705 --   Temporal Monitor Sitting Right arm       Pain Score       05/02/24 2025       No Pain          Wt Readings from Last 1 Encounters:   05/02/24 71.8 kg (158 lb 3.2 oz)      Additional Vital Signs:   Date/Time Temp Pulse Resp BP MAP (mmHg) SpO2 O2 Device Patient Position - Orthostatic VS   05/03/24 0730 97.6 °F (36.4 °C) 74 18 136/74 -- 99 % -- Lying   05/03/24 0530 97.2 °F (36.2 °C) Abnormal  71 18 145/78 100 98 % None (Room air) Lying   05/03/24 03:48:01 97 °F (36.1 °C) Abnormal  87 18 102/62 75 99 % -- --   05/03/24 0330 97.2 °F (36.2 °C) Abnormal  85 18 106/69 81 99 % None (Room air) Sitting   05/03/24 0130 97.7 °F (36.5 °C) 91 18 183/95 Abnormal  124 95 % None (Room air) Lying   05/02/24 2330 97.7 °F (36.5 °C) 85 18 187/93 Abnormal  124 95 % None (Room air) Lying   05/02/24 2230 97.5 °F (36.4 °C) 89 18 189/92 Abnormal  124 96 % None (Room air) Lying   05/02/24 2130 97.3 °F (36.3 °C) Abnormal  90 18 173/80 Abnormal  111 96 % None (Room air) Lying   05/02/24 20:33:54 97.3 °F (36.3 °C) Abnormal  92 -- 196/99 Abnormal  131 97 % -- --   05/02/24 2030 97.3 °F (36.3 °C) Abnormal  92 20 199/98 Abnormal  132 97 % None (Room air) Lying   05/02/24 1949 -- 93 58 Abnormal  -- -- 93 % -- --   05/02/24 1945 -- 81 20 -- -- 97 % -- --   05/02/24 1940 -- 82 27 Abnormal  -- -- 96 % -- --   05/02/24 1925 -- 85 24 Abnormal  172/70 Abnormal  -- 96 % None (Room air) --   05/02/24 1900 -- 92 20 209/100 Abnormal  144 97 % -- --   05/02/24 1855 -- 88 22 197/94 Abnormal  -- 97 % None (Room air) Lying   05/02/24 1843 -- 99 24 Abnormal  -- -- -- -- --   05/02/24 1840 -- 92 21 212/94 Abnormal  -- 98 % None (Room air) Lying   05/02/24 1825 -- 88 21 212/94 Abnormal  -- 97 % None (Room air) Lying   05/02/24 1814 -- 98 27 Abnormal  -- -- 97 % -- --   05/02/24 1811 -- -- 28 Abnormal  -- -- -- -- --   05/02/24 1810 -- 91 22 185/76 Abnormal  -- 97 % None (Room air) Lying   05/02/24 1800 -- 89 23 Abnormal  -- -- 97 % -- --   05/02/24 1755 -- 89 18 185/76 Abnormal  -- 97 % Face tent Lying   05/02/24 1744 -- 98 28 Abnormal  -- -- 96 % -- --   05/02/24 1740 -- 82 18 209/88 Abnormal  -- 96 % None (Room air) --    05/02/24 1726 -- -- -- 187/88 Abnormal  -- -- -- --   05/02/24 1725 -- 90 18 187/88 Abnormal  -- 97 % None (Room air) --   05/02/24 1705 98.2 °F (36.8 °C) 89 18 232/106 Abnormal  -- 97 % None (Room air) Sitting       EKG: Sinus rhythm with frequent Premature ventricular complexes  Left axis deviation  Inferior infarct , age undetermined  Abnormal ECG      Pertinent Labs/Diagnostic Test Results:   CTA stroke alert (head/neck)   Final Result by Xu Joe MD (05/02 1758)      No significant interval change since prior examination.      No evidence for major branch vessel occlusion of the anterior circulation.      Diffuse atherosclerotic irregularity with areas of stenosis involving both the anterior and posterior circulation as described above.      Findings were directly discussed with Hang Goncalves at 5:40 p.m.                           Workstation performed: [id number]         CT stroke alert brain   Final Result by Xu Joe MD (05/02 1647)      No mass effect, acute intracranial hemorrhage or evidence of recent infarction.      Findings were directly discussed with Hang Goncalves at approximately 5:40 p.m.      Workstation performed: [id number]         XR knee 4+ vw left injury    (Results Pending)   MRI brain wo contrast    (Results Pending)     Results from last 7 days   Lab Units 05/02/24  1728   SARS-COV-2  Negative     Results from last 7 days   Lab Units 05/03/24  0537 05/02/24  1728   WBC Thousand/uL 11.04* 9.18   HEMOGLOBIN g/dL 12.6 12.2   HEMATOCRIT % 36.9 36.2   PLATELETS Thousands/uL 237 235   TOTAL NEUT ABS Thousands/µL 9.36*  --         Results from last 7 days   Lab Units 05/03/24  0537 05/02/24  1728   SODIUM mmol/L 137 138   POTASSIUM mmol/L 3.7 4.0   CHLORIDE mmol/L 103 106   CO2 mmol/L 26 25   ANION GAP mmol/L 8 7   BUN mg/dL 11 15   CREATININE mg/dL 0.92 0.82   EGFR ml/min/1.73sq m 56 65   CALCIUM mg/dL 9.2 9.3        Results from last 7 days   Lab Units 05/03/24  0357 05/02/24  1725    POC GLUCOSE mg/dl 182* 119     Results from last 7 days   Lab Units 05/03/24  0537 05/02/24  1728   GLUCOSE RANDOM mg/dL 166* 112        Results from last 7 days   Lab Units 05/02/24  1836 05/02/24  1728   HS TNI 0HR ng/L  --  5   HS TNI 2HR ng/L 4  --    HSTNI D2 ng/L -1  --         Results from last 7 days   Lab Units 05/02/24  1728   PROTIME seconds 13.5   INR  1.00   PTT seconds 22*        Results from last 7 days   Lab Units 05/02/24  1728   INFLUENZA A PCR  Negative   INFLUENZA B PCR  Negative   RSV PCR  Negative          ED Treatment:   Medication Administration from 05/02/2024 1655 to 05/02/2024 2024         Date/Time Order Dose Route Action     05/02/2024 1736 EDT iohexol (OMNIPAQUE) 350 MG/ML injection (MULTI-DOSE) 85 mL 85 mL Intravenous Given     05/02/2024 1902 EDT labetalol (NORMODYNE) injection 10 mg 10 mg Intravenous Given            Present on Admission:   Stroke-like symptom   Essential hypertension      Admitting Diagnosis: Hypertension [I10]  Stroke-like symptom [R29.90]    Age/Sex: 83 y.o. female    Admission Orders: SCDs; tele monitoring' I/O; neuro checks; regular diet    Scheduled Medications:  amLODIPine, 5 mg, Oral, Daily  aspirin, 81 mg, Oral, Daily  atorvastatin, 40 mg, Oral, QPM  clopidogrel, 75 mg, Oral, Daily  enoxaparin, 40 mg, Subcutaneous, Daily  losartan, 50 mg, Oral, Daily      Continuous IV Infusions: None       PRN Meds:  acetaminophen, 650 mg, Oral, Q6H PRN  hydrOXYzine HCL, 25 mg, Oral, Q6H PRN        IP CONSULT TO NEUROLOGY  IP CONSULT TO CASE MANAGEMENT  IP CONSULT TO NUTRITION SERVICES    Network Utilization Review Department  ATTENTION: Please call with any questions or concerns to [phone] and carefully listen to the prompts so that you are directed to the right person. All voicemails are confidential.   For Discharge needs, contact Care Management DC Support Team at [phone] opt. 2  Send all requests for admission clinical reviews, approved or denied  determinations and any other requests to dedicated fax number below belonging to the campus where the patient is receiving treatment. List of dedicated fax numbers for the Facilities:  FACILITY NAME UR FAX NUMBER   ADMISSION DENIALS (Administrative/Medical Necessity) [phone]   DISCHARGE SUPPORT TEAM (NETWORK) [phone]   PARENT CHILD HEALTH (Maternity/NICU/Pediatrics) [phone]   [facility] [phone]   [facility] [phone]   [facility] [phone]   [facility] [phone]   [facility] [phone]   [facility] [phone]   [facility] [phone]   [facility] [phone]   [facility] [phone]   [facility] [phone]   [facility] [phone]   [facility] [phone]

## 2024-05-03 NOTE — ED ATTENDING ATTESTATION
5/2/2024  INicole DO, saw and evaluated the patient. I have discussed the patient with the resident/non-physician practitioner and agree with the resident's/non-physician practitioner's findings, Plan of Care, and MDM as documented in the resident's/non-physician practitioner's note, except where noted. All available labs and Radiology studies were reviewed.  I was present for key portions of any procedure(s) performed by the resident/non-physician practitioner and I was immediately available to provide assistance.       At this point I agree with the current assessment done in the Emergency Department.      ED Course       83-year-old female presenting to the ED for evaluation of uncontrolled hypertension and joint plain mostly in the right shoulder.  Patient admitted to the hospitalist service     Critical Care Time  Procedures

## 2024-05-03 NOTE — ASSESSMENT & PLAN NOTE
Patient presented with left sided weakness  Feels the  is poor on the left  No prior h/o stroke  Ct head is  unremarkable  Check fasting lipid panel, hba1c  Neurochecks, telemetry monitoring  Cont asa and statin  Check MRI brain  Neurology consult

## 2024-05-03 NOTE — PLAN OF CARE
Problem: Neurological Deficit  Goal: Neurological status is stable or improving  Description: Interventions:  - Monitor and assess patient's level of consciousness, motor function, sensory function, and level of assistance needed for ADLs.   - Monitor and report changes from baseline. Collaborate with interdisciplinary team to initiate plan and implement interventions as ordered.   - Provide and maintain a safe environment.  - Consider seizure precautions.  - Consider fall precautions.  - Consider aspiration precautions.  - Consider bleeding precautions.  5/3/2024 1616 by Pooja Escalona RN  Outcome: Adequate for Discharge  5/3/2024 0832 by Pooja Escalona RN  Outcome: Progressing     Problem: Activity Intolerance/Impaired Mobility  Goal: Mobility/activity is maintained at optimum level for patient  Description: Interventions:  - Assess and monitor patient  barriers to mobility and need for assistive/adaptive devices.  - Assess patient's emotional response to limitations.  - Collaborate with interdisciplinary team and initiate plans and interventions as ordered.  - Encourage independent activity per ability.  - Maintain proper body alignment.  - Perform active/passive rom as tolerated/ordered.  - Plan activities to conserve energy.  - Turn patient as appropriate  5/3/2024 1616 by Pooja Escalona RN  Outcome: Adequate for Discharge  5/3/2024 0832 by Pooja Escalona RN  Outcome: Progressing     Problem: Communication Impairment  Goal: Ability to express needs and understand communication  Description: Assess patient's communication skills and ability to understand information.  Patient will demonstrate use of effective communication techniques, alternative methods of communication and understanding even if not able to speak.     - Encourage communication and provide alternate methods of communication as needed.  - Collaborate with case management/ for discharge needs.  - Include  patient/family/caregiver in decisions related to communication.  5/3/2024 1616 by Pooja Escalona RN  Outcome: Adequate for Discharge  5/3/2024 0832 by Pooja Escalona RN  Outcome: Progressing     Problem: Potential for Aspiration  Goal: Non-ventilated patient's risk of aspiration is minimized  Description: Assess and monitor vital signs, respiratory status, and labs (WBC).  Monitor for signs of aspiration (tachypnea, cough, rales, wheezing, cyanosis, fever).    - Assess and monitor patient's ability to swallow.  - Place patient up in chair to eat if possible.  - HOB up at 90 degrees to eat if unable to get patient up into chair.  - Supervise patient during oral intake.   - Instruct patient/ family to take small bites.  - Instruct patient/ family to take small single sips when taking liquids.  - Follow patient-specific strategies generated by speech pathologist.  5/3/2024 1616 by Pooja Escalona RN  Outcome: Adequate for Discharge  5/3/2024 0832 by Pooja Escalona RN  Outcome: Progressing  Goal: Ventilated patient's risk of aspiration is minimized  Description: Assess and monitor vital signs, respiratory status, airway cuff pressure, and labs (WBC).  Monitor for signs of aspiration (tachypnea, cough, rales, wheezing, cyanosis, fever).    - Elevate head of bed 30 degrees if patient has tube feeding.  - Monitor tube feeding.  5/3/2024 1616 by Pooja Escalona RN  Outcome: Adequate for Discharge  5/3/2024 0832 by Pooja Escalona RN  Outcome: Progressing     Problem: Nutrition  Goal: Nutrition/Hydration status is improving  Description: Monitor and assess patient's nutrition/hydration status for malnutrition (ex- brittle hair, bruises, dry skin, pale skin and conjunctiva, muscle wasting, smooth red tongue, and disorientation). Collaborate with interdisciplinary team and initiate plan and interventions as ordered.  Monitor patient's weight and dietary intake as ordered or per policy. Utilize nutrition screening tool  and intervene per policy. Determine patient's food preferences and provide high-protein, high-caloric foods as appropriate.     - Assist patient with eating.  - Allow adequate time for meals.  - Encourage patient to take dietary supplement as ordered.  - Collaborate with clinical nutritionist.  - Include patient/family/caregiver in decisions related to nutrition.  5/3/2024 1616 by Pooja Escalona RN  Outcome: Adequate for Discharge  5/3/2024 0832 by Pooja Escalona RN  Outcome: Progressing     Problem: PAIN - ADULT  Goal: Verbalizes/displays adequate comfort level or baseline comfort level  Description: Interventions:  - Encourage patient to monitor pain and request assistance  - Assess pain using appropriate pain scale  - Administer analgesics based on type and severity of pain and evaluate response  - Implement non-pharmacological measures as appropriate and evaluate response  - Consider cultural and social influences on pain and pain management  - Notify physician/advanced practitioner if interventions unsuccessful or patient reports new pain  5/3/2024 1616 by Pooja Escalona RN  Outcome: Adequate for Discharge  5/3/2024 0832 by Pooja Escalona RN  Outcome: Progressing     Problem: INFECTION - ADULT  Goal: Absence or prevention of progression during hospitalization  Description: INTERVENTIONS:  - Assess and monitor for signs and symptoms of infection  - Monitor lab/diagnostic results  - Monitor all insertion sites, i.e. indwelling lines, tubes, and drains  - Monitor endotracheal if appropriate and nasal secretions for changes in amount and color  - Antonito appropriate cooling/warming therapies per order  - Administer medications as ordered  - Instruct and encourage patient and family to use good hand hygiene technique  - Identify and instruct in appropriate isolation precautions for identified infection/condition  5/3/2024 1616 by Pooja Escalona RN  Outcome: Adequate for Discharge  5/3/2024 0832 by  Pooja Escalona RN  Outcome: Progressing     Problem: SAFETY ADULT  Goal: Patient will remain free of falls  Description: INTERVENTIONS:  - Educate patient/family on patient safety including physical limitations  - Instruct patient to call for assistance with activity   - Consult OT/PT to assist with strengthening/mobility   - Keep Call bell within reach  - Keep bed low and locked with side rails adjusted as appropriate  - Keep care items and personal belongings within reach  - Initiate and maintain comfort rounds  - Make Fall Risk Sign visible to staff    - Apply yellow socks and bracelet for high fall risk patients  - Consider moving patient to room near nurses station  5/3/2024 1616 by Pooja Escalona RN  Outcome: Adequate for Discharge  5/3/2024 0832 by Pooja Escalona RN  Outcome: Progressing  Goal: Maintain or return to baseline ADL function  Description: INTERVENTIONS:  -  Assess patient's ability to carry out ADLs; assess patient's baseline for ADL function and identify physical deficits which impact ability to perform ADLs (bathing, care of mouth/teeth, toileting, grooming, dressing, etc.)  - Assess/evaluate cause of self-care deficits   - Assess range of motion  - Assess patient's mobility; develop plan if impaired  - Assess patient's need for assistive devices and provide as appropriate  - Encourage maximum independence but intervene and supervise when necessary  - Involve family in performance of ADLs  - Assess for home care needs following discharge   - Consider OT consult to assist with ADL evaluation and planning for discharge  - Provide patient education as appropriate  5/3/2024 1616 by Pooja Escalona RN  Outcome: Adequate for Discharge  5/3/2024 0832 by Pooja Escalona RN  Outcome: Progressing  Goal: Maintains/Returns to pre admission functional level  Description: INTERVENTIONS:  - Perform AM-PAC 6 Click Basic Mobility/ Daily Activity assessment daily.  - Set and communicate daily mobility  goal to care team and patient/family/caregiver.   - Collaborate with rehabilitation services on mobility goals if consulted    - Out of bed for toileting  - Record patient progress and toleration of activity level   5/3/2024 1616 by Pooja Escalona RN  Outcome: Adequate for Discharge  5/3/2024 0832 by Pooja Escalona RN  Outcome: Progressing     Problem: DISCHARGE PLANNING  Goal: Discharge to home or other facility with appropriate resources  Description: INTERVENTIONS:  - Identify barriers to discharge w/patient and caregiver  - Arrange for needed discharge resources and transportation as appropriate  - Identify discharge learning needs (meds, wound care, etc.)  - Arrange for interpretive services to assist at discharge as needed  - Refer to Case Management Department for coordinating discharge planning if the patient needs post-hospital services based on physician/advanced practitioner order or complex needs related to functional status, cognitive ability, or social support system  5/3/2024 1616 by Pooja Escalona RN  Outcome: Adequate for Discharge  5/3/2024 0832 by Pooja Escalona RN  Outcome: Progressing     Problem: Knowledge Deficit  Goal: Patient/family/caregiver demonstrates understanding of disease process, treatment plan, medications, and discharge instructions  Description: Complete learning assessment and assess knowledge base.  Interventions:  - Provide teaching at level of understanding  - Provide teaching via preferred learning methods  5/3/2024 1616 by Pooja Escalona RN  Outcome: Adequate for Discharge  5/3/2024 0832 by Pooja Escalona RN  Outcome: Progressing     Problem: NEUROSENSORY - ADULT  Goal: Achieves stable or improved neurological status  Description: INTERVENTIONS  - Monitor and report changes in neurological status  - Monitor vital signs such as temperature, blood pressure, glucose, and any other labs ordered   - Initiate measures to prevent increased intracranial pressure  -  Monitor for seizure activity and implement precautions if appropriate      5/3/2024 1616 by Pooja Escalona RN  Outcome: Adequate for Discharge  5/3/2024 0832 by Pooja Escalona RN  Outcome: Progressing  Goal: Remains free of injury related to seizures activity  Description: INTERVENTIONS  - Maintain airway, patient safety  and administer oxygen as ordered  - Monitor patient for seizure activity, document and report duration and description of seizure to physician/advanced practitioner  - If seizure occurs,  ensure patient safety during seizure  - Reorient patient post seizure  - Seizure pads on all 4 side rails  - Instruct patient/family to notify RN of any seizure activity including if an aura is experienced  - Instruct patient/family to call for assistance with activity based on nursing assessment  - Administer anti-seizure medications if ordered    5/3/2024 1616 by Pooja Escalona RN  Outcome: Adequate for Discharge  5/3/2024 0832 by Pooja Escalona RN  Outcome: Progressing  Goal: Achieves maximal functionality and self care  Description: INTERVENTIONS  - Monitor swallowing and airway patency with patient fatigue and changes in neurological status  - Encourage and assist patient to increase activity and self care.   - Encourage visually impaired, hearing impaired and aphasic patients to use assistive/communication devices  5/3/2024 1616 by Pooja Escalona RN  Outcome: Adequate for Discharge  5/3/2024 0832 by Pooja Escalona RN  Outcome: Progressing     Problem: CARDIOVASCULAR - ADULT  Goal: Maintains optimal cardiac output and hemodynamic stability  Description: INTERVENTIONS:  - Monitor I/O, vital signs and rhythm  - Monitor for S/S and trends of decreased cardiac output  - Administer and titrate ordered vasoactive medications to optimize hemodynamic stability  - Assess quality of pulses, skin color and temperature  - Assess for signs of decreased coronary artery perfusion  - Instruct patient to report  change in severity of symptoms  5/3/2024 1616 by Pooja Escalona RN  Outcome: Adequate for Discharge  5/3/2024 0832 by Pooja Escalona RN  Outcome: Progressing  Goal: Absence of cardiac dysrhythmias or at baseline rhythm  Description: INTERVENTIONS:  - Continuous cardiac monitoring, vital signs, obtain 12 lead EKG if ordered  - Administer antiarrhythmic and heart rate control medications as ordered  - Monitor electrolytes and administer replacement therapy as ordered  5/3/2024 1616 by Pooja Escalona RN  Outcome: Adequate for Discharge  5/3/2024 0832 by Pooja Escalona RN  Outcome: Progressing     Problem: RESPIRATORY - ADULT  Goal: Achieves optimal ventilation and oxygenation  Description: INTERVENTIONS:  - Assess for changes in respiratory status  - Assess for changes in mentation and behavior  - Position to facilitate oxygenation and minimize respiratory effort  - Oxygen administered by appropriate delivery if ordered  - Initiate smoking cessation education as indicated  - Encourage broncho-pulmonary hygiene including cough, deep breathe, Incentive Spirometry  - Assess the need for suctioning and aspirate as needed  - Assess and instruct to report SOB or any respiratory difficulty  - Respiratory Therapy support as indicated  5/3/2024 1616 by Pooja Escalona RN  Outcome: Adequate for Discharge  5/3/2024 0832 by Pooja Escalona RN  Outcome: Progressing     Problem: METABOLIC, FLUID AND ELECTROLYTES - ADULT  Goal: Electrolytes maintained within normal limits  Description: INTERVENTIONS:  - Monitor labs and assess patient for signs and symptoms of electrolyte imbalances  - Administer electrolyte replacement as ordered  - Monitor response to electrolyte replacements, including repeat lab results as appropriate  - Instruct patient on fluid and nutrition as appropriate  5/3/2024 1616 by Pooja Escalona RN  Outcome: Adequate for Discharge  5/3/2024 0832 by Pooja Escalona RN  Outcome: Progressing  Goal: Fluid  balance maintained  Description: INTERVENTIONS:  - Monitor labs   - Monitor I/O and WT  - Instruct patient on fluid and nutrition as appropriate  - Assess for signs & symptoms of volume excess or deficit  5/3/2024 1616 by Pooja Escalona RN  Outcome: Adequate for Discharge  5/3/2024 0832 by Pooja Escalona RN  Outcome: Progressing  Goal: Glucose maintained within target range  Description: INTERVENTIONS:  - Monitor Blood Glucose as ordered  - Assess for signs and symptoms of hyperglycemia and hypoglycemia  - Administer ordered medications to maintain glucose within target range  - Assess nutritional intake and initiate nutrition service referral as needed  5/3/2024 1616 by Pooja Escalona RN  Outcome: Adequate for Discharge  5/3/2024 0832 by Pooja Escalona RN  Outcome: Progressing     Problem: HEMATOLOGIC - ADULT  Goal: Maintains hematologic stability  Description: INTERVENTIONS  - Assess for signs and symptoms of bleeding or hemorrhage  - Monitor labs  - Administer supportive blood products/factors as ordered and appropriate  5/3/2024 1616 by Pooja Escalona RN  Outcome: Adequate for Discharge  5/3/2024 0832 by Pooja Escalona RN  Outcome: Progressing     Problem: MUSCULOSKELETAL - ADULT  Goal: Maintain or return mobility to safest level of function  Description: INTERVENTIONS:  - Assess patient's ability to carry out ADLs; assess patient's baseline for ADL function and identify physical deficits which impact ability to perform ADLs (bathing, care of mouth/teeth, toileting, grooming, dressing, etc.)  - Assess/evaluate cause of self-care deficits   - Assess range of motion  - Assess patient's mobility  - Assess patient's need for assistive devices and provide as appropriate  - Encourage maximum independence but intervene and supervise when necessary  - Involve family in performance of ADLs  - Assess for home care needs following discharge   - Consider OT consult to assist with ADL evaluation and planning for  discharge  - Provide patient education as appropriate  5/3/2024 1616 by Pooja Escalona RN  Outcome: Adequate for Discharge  5/3/2024 0832 by Pooja Escalona RN  Outcome: Progressing  Goal: Maintain proper alignment of affected body part  Description: INTERVENTIONS:  - Support, maintain and protect limb and body alignment  - Provide patient/ family with appropriate education  5/3/2024 1616 by Pooja Escalona RN  Outcome: Adequate for Discharge  5/3/2024 0832 by Pooja Escalona RN  Outcome: Progressing

## 2024-05-03 NOTE — DISCHARGE SUMMARY
[facility]  Discharge- Dayan Joshi 1940, 83 y.o. female MRN: [id number]  Unit/Bed#: -01 Encounter: [id number]  Primary Care Provider: Phil Boykin MD   Date and time admitted to hospital: 5/2/2024  5:02 PM    * Stroke-like symptoms  Assessment & Plan  Stroke ruled out  Patient presented with left sided weakness  Feels the  is poor on the left  No prior h/o stroke  Ct head is  unremarkable  MRI negative for acute stroke.  PT/OT recommendation appreciated  Plans the bedside swallow evaluation, and tolerating diet without any difficulties, has good gag reflex  Discussed the case with neurology, since patient is still has left upper extremity weakness.  Recommendation at this time to follow-up neurology outpatient basis for further evaluation-discussed the recommendation with patient and family member at the bedside.  Verbalizes to understand and agrees.  At this time, we are discharging patient back home.    Paresthesia  Assessment & Plan  Affecting left upper extremity, MRI of the brain negative for stroke  Recommending to follow-up with neurology outpatient for basis for further workup.        Medical Problems       Resolved Problems  Date Reviewed: 5/2/2024            Resolved    Essential hypertension 5/3/2024     Resolved by  Jc Ayala MD        Discharging Physician / Practitioner: Jc Ayala MD  PCP: Phil Boykin MD  Admission Date:   Admission Orders (From admission, onward)       Ordered        05/02/24 1953  Place in Observation  Once                          Discharge Date: 05/03/24    Consultations During Hospital Stay:  Neurology, PT/OT    Procedures Performed:   MRI brain wo contrast   Final Result by Xu Joe MD (05/03 0951)      No acute infarction, intracranial hemorrhage, or mass effect.      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      Resident: Mike Juárez I, the attending radiologist, have reviewed the images  and agree with the final report above.      Workstation performed: [id number]         XR knee 4+ vw left injury   Final Result by Aimee Headley MD (05/03 0901)      No acute osseous abnormality.   Tricompartmental osteoarthritis.            Workstation performed: [id number]         CTA stroke alert (head/neck)   Final Result by Xu Joe MD (05/02 7058)      No significant interval change since prior examination.      No evidence for major branch vessel occlusion of the anterior circulation.      Diffuse atherosclerotic irregularity with areas of stenosis involving both the anterior and posterior circulation as described above.      Findings were directly discussed with Hang Goncalves at 5:40 p.m.                           Workstation performed: [id number]         CT stroke alert brain   Final Result by Xu Joe MD (05/03 0952)   Addendum (preliminary) 1 of 1 by Xu Joe MD (05/03 0952)   ADDENDUM:      Please note that there is an error in the body of the report under orbits.    Patient appears to be status post left-sided intraocular silicone    tamponade.      Final      No mass effect, acute intracranial hemorrhage or evidence of recent infarction.      Findings were directly discussed with Hang Goncalves at approximately 5:40 p.m.      Workstation performed: [id number]               Significant Findings / Test Results:   Lab Results   Component Value Date    WBC 11.04 (H) 05/03/2024    HGB 12.6 05/03/2024    HCT 36.9 05/03/2024    MCV 87 05/03/2024     05/03/2024     Lab Results   Component Value Date    SODIUM 137 05/03/2024    K 3.7 05/03/2024     05/03/2024    CO2 26 05/03/2024    AGAP 8 05/03/2024    BUN 11 05/03/2024    CREATININE 0.92 05/03/2024    GLUC 166 (H) 05/03/2024    CALCIUM 9.2 05/03/2024    AST 14 04/19/2023    ALT 11 04/19/2023    ALKPHOS 70 04/19/2023    TP 7.1 04/19/2023    TBILI 0.4 04/19/2023    EGFR 56 05/03/2024     Lab Results   Component Value Date    CHOLESTEROL  "143 05/03/2024    CHOLESTEROL 156 02/18/2022     Lab Results   Component Value Date    HDL 49 (L) 05/03/2024    HDL 49 (L) 02/18/2022     Lab Results   Component Value Date    TRIG 94 05/03/2024    TRIG 111 02/18/2022     No results found for: \"NONHDLC\"  Lab Results   Component Value Date    HGBA1C 6.1 (H) 05/03/2024         Incidental Findings:   As mentioned above  I reviewed the above mentioned incidental findings with the patient and/or family and they expressed understanding.    Test Results Pending at Discharge (will require follow up):   none     Outpatient Tests Requested:  none    Complications: None    Reason for Admission: Left upper extremity weakness    Hospital Course:   Dayan Joshi is a 83 y.o. female patient who originally presented to the hospital on 5/2/2024 due to left upper extremity weakness, stroke alert initiated, stroke rule out.  MRI negative.  Patient evaluated by PT OT recommendation to return home with follow-up with PCP.  Patient passed bedside swallow evaluation and eating without any difficulties.  Telemetry remained uneventful.  On physical exam, patient is still has some left upper extremity weakness.  Suspect most likely nerve related, discussed the recommendation to follow-up with PCP/neurology outpatient basis for further recommendation.    Patient and family member verbalizes to understand and agrees.    Please see above list of diagnoses and related plan for additional information.     Condition at Discharge: stable    Discharge Day Visit / Exam:   Subjective: Seen and evaluated during daytime.  Lying in the bed, still complaining of left upper extremity weakness.  Vitals: Blood Pressure: 119/67 (05/03/24 1504)  Pulse: 77 (05/03/24 1130)  Temperature: 97.9 °F (36.6 °C) (05/03/24 1504)  Temp Source: Temporal (05/03/24 0530)  Respirations: 18 (05/03/24 1130)  Height: 5' 4\" (162.6 cm) (05/02/24 2028)  Weight - Scale: 71.8 kg (158 lb 3.2 oz) (05/02/24 2028)  SpO2: 99 % " (05/03/24 1130)  Exam:   Physical Exam  Vitals and nursing note reviewed.   Constitutional:       Appearance: Normal appearance. She is not ill-appearing or diaphoretic.   HENT:      Mouth/Throat:      Mouth: Mucous membranes are dry.   Cardiovascular:      Rate and Rhythm: Normal rate.      Heart sounds: No murmur heard.     No friction rub. No gallop.   Pulmonary:      Effort: Pulmonary effort is normal. No respiratory distress.      Breath sounds: No stridor. No wheezing or rhonchi.   Neurological:      Mental Status: She is alert and oriented to person, place, and time.      Cranial Nerves: No cranial nerve deficit.      Motor: Weakness (left upper extremity) present.      Coordination: Coordination normal.   Psychiatric:         Mood and Affect: Mood normal.       Discussion with Family: Updated  (daughter and daughter in law) at bedside.    Discharge instructions/Information to patient and family:   See after visit summary for information provided to patient and family.      Provisions for Follow-Up Care:  See after visit summary for information related to follow-up care and any pertinent home health orders.      Mobility at time of Discharge:   Basic Mobility Inpatient Raw Score: 18  JH-HLM Goal: 6: Walk 10 steps or more  JH-HLM Achieved: 6: Walk 10 steps or more  HLM Goal achieved. Continue to encourage appropriate mobility.     Disposition:   Home    Planned Readmission: If condition get worse     Discharge Statement:   Greater than 50% of the total time was spent examining patient, answering all patient questions, arranging and discussing plan of care with patient as well as directly providing post-discharge instructions.  Additional time then spent on discharge activities.    Discharge Medications:  See after visit summary for reconciled discharge medications provided to patient and/or family.      **Please Note: This note may have been constructed using a voice recognition system**

## 2024-05-03 NOTE — CASE MANAGEMENT
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 2022      LOS (days): 0  Geometric Mean LOS (GMLOS) (days):   Days to GMLOS:     OBJECTIVE:              Current admission status: Observation  Referral Reason: Other (ischemic stroke)    Preferred Pharmacy:   [facility] - [address] [address]  Phone: [phone] Fax: [phone]    [id number] [place] - [address] [place] [address]  Phone: [phone] Fax: [phone]    Primary Care Provider: Phil Boykin MD    Primary Insurance: [facility]  Secondary Insurance:     ASSESSMENT:  Active Health Care Proxies    There are no active Health Care Proxies on file.       Advance Directives  Does patient have a Health Care POA?: Yes  Does patient have Advance Directives?: Yes  Advance Directives: Living will, Power of  for health care  Primary Contact: Davion Vega, son, is POA, per patient         Readmission Root Cause  30 Day Readmission: No    Patient Information  Admitted from:: Home  Mental Status: Alert  During Assessment patient was accompanied by: Not accompanied during assessment  Assessment information provided by:: Patient  Primary Caregiver: Self  Support Systems: Son, Daughter, Family members  County of Residence: [facility]  What [facility] do you live in?: [place]  Home entry access options. Select all that apply.: Stairs  Number of steps to enter home.: 1  Do the steps have railings?: No  Type of  Current Residence: 2 story home  Upon entering residence, is there a bedroom on the main floor (no further steps)?: No  A bedroom is located on the following floor levels of residence (select all that apply):: 2nd Floor  Upon entering residence, is there a bathroom on the main floor (no further steps)?: Yes  Number of steps to 2nd floor from main floor: One Flight  Living Arrangements: Lives Alone  Is patient a ?: No    Activities of Daily Living Prior to Admission  Functional Status: Independent  Completes ADLs independently?: Yes  Ambulates independently?: Yes  Does patient use assisted devices?: No  Does patient currently own DME?: Yes  What DME does the patient currently own?: Straight Cane, Walker  Does patient have a history of Outpatient Therapy (PT/OT)?: No  Does the patient have a history of Short-Term Rehab?: No  Does patient have a history of HHC?: Yes ([facility])  Does patient currently have HHC?: No         Patient Information Continued  Income Source: SSI/SSD  Does patient have prescription coverage?: Yes  Does patient receive dialysis treatments?: No  Does patient have a history of substance abuse?: No  Does patient have a history of Mental Health Diagnosis?: No         Means of Transportation  Means of Transport to Appts:: Drives Self      Social Determinants of Health (SDOH)      Flowsheet Row Most Recent Value   Housing Stability    In the last 12 months, was there a time when you were not able to pay the mortgage or rent on time? N   In the last 12 months, how many places have you lived? 1   In the last 12 months, was there a time when you did not have a steady place to sleep or slept in a shelter (including now)? N   Transportation Needs    In the past 12 months, has lack of transportation kept you from medical appointments or from getting medications? no   In the past 12 months, has lack of transportation kept you from meetings, work, or from getting things needed for daily living? No    Food Insecurity    Within the past 12 months, you worried that your food would run out before you got the money to buy more. Never true   Within the past 12 months, the food you bought just didn't last and you didn't have money to get more. Never true   Utilities    In the past 12 months has the electric, gas, oil, or water company threatened to shut off services in your home? No            DISCHARGE DETAILS:    Discharge planning discussed with:: patient  Freedom of Choice: Yes     CM contacted family/caregiver?: No- see comments (CM will follow up with family at later time)                  CM met with patient at the bedside, baseline information was obtained. CM discussed the role of CM in helping the patient develop a discharge plan and assist the patient in carry out their plan.      Patient lives in 2 story home and is independent at baseline. Patient reports she owns RW and cane and but does not use them.  Patient drives.    CM to follow for CM discharge referral needs, if any.

## 2024-05-03 NOTE — ASSESSMENT & PLAN NOTE
Patient on multiple meds for HTN  Currently slightly elevated  But allow permissive htn  Cont to monitor

## 2024-05-03 NOTE — H&P
H&P Exam - Dayan Joshi 83 y.o. female MRN: [id number]    Unit/Bed#: -01 Encounter: [id number]    Assessment:    Assessment & Plan    Stroke-like symptom  Patient presented with left sided weakness  Feels the  is poor on the left  No prior h/o stroke  Ct head is  unremarkable  Check fasting lipid panel, hba1c  Neurochecks, telemetry monitoring  Cont asa, add plavix and statin  Check MRI brain, if positive, would do a 2d echocardiogram  Neurology consult    Essential hypertension  Assessment & Plan  Patient on multiple meds for HTN  Currently slightly elevated  But allow permissive htn  Cont to monitor        History of Present Illness   Patient is an 82 yo female with h/o HTN who presents to the ED with complaints of left sided weakness. Patient reports she woke up this morning and noted her left arm was weak. She felt that it may have gone numb as she slept on it but it continued to be numb and she dropped things from her left hand throughout the day. She also noted her left leg weakness. She had trouble walking. She has b/l knee pain likely from arthritis which bothered her today. She denies any speech difficulty. No headaches or blurred vision    Review of Systems  negative  Historical Information   Past Medical History:   Diagnosis Date    Hypertension     Psoriasis     Stroke (HCC)      History reviewed. No pertinent surgical history.  Social History   Social History     Substance and Sexual Activity   Alcohol Use Never     Social History     Substance and Sexual Activity   Drug Use Never     Social History     Tobacco Use   Smoking Status Never   Smokeless Tobacco Never     E-Cigarette Use: Never User     E-Cigarette/Vaping Substances       Family History: non-contributory    Meds/Allergies   all medications and allergies reviewed  No Known Allergies    Objective   First Vitals:   Blood Pressure: (!) 232/106 (05/02/24 1705)  Pulse: 89 (05/02/24 1705)  Temperature: 98.2 °F (36.8 °C) (05/02/24  "1705)  Temp Source: Temporal (05/02/24 1705)  Respirations: 18 (05/02/24 1705)  Height: 5' 4\" (162.6 cm) (05/02/24 2028)  Weight - Scale: 71.8 kg (158 lb 3.2 oz) (05/02/24 1727)  SpO2: 97 % (05/02/24 1705)    Current Vitals:   Blood Pressure: (!) 196/99 (05/02/24 2033)  Pulse: 92 (05/02/24 2033)  Temperature: (!) 97.3 °F (36.3 °C) (05/02/24 2033)  Temp Source: Temporal (05/02/24 2028)  Respirations: 20 (05/02/24 2028)  Height: 5' 4\" (162.6 cm) (05/02/24 2028)  Weight - Scale: 71.8 kg (158 lb 3.2 oz) (05/02/24 2028)  SpO2: 97 % (05/02/24 2033)    No intake or output data in the 24 hours ending 05/02/24 2139    Invasive Devices       Peripheral Intravenous Line  Duration             Peripheral IV 05/02/24 Right Antecubital <1 day                    Physical Exam  Constitutional:       General: She is not in acute distress.     Appearance: Normal appearance. She is not ill-appearing.   HENT:      Head: Normocephalic and atraumatic.      Mouth/Throat:      Mouth: Mucous membranes are moist.   Eyes:      General: No scleral icterus.     Extraocular Movements: Extraocular movements intact.      Conjunctiva/sclera: Conjunctivae normal.   Cardiovascular:      Rate and Rhythm: Normal rate and regular rhythm.      Pulses: Normal pulses.      Heart sounds: Normal heart sounds. No murmur heard.     No gallop.   Pulmonary:      Effort: No respiratory distress.      Breath sounds: No wheezing or rales.   Abdominal:      General: Abdomen is flat. Bowel sounds are normal. There is no distension.      Palpations: Abdomen is soft.      Tenderness: There is no abdominal tenderness. There is no guarding.   Musculoskeletal:         General: Normal range of motion.      Cervical back: Normal range of motion and neck supple.      Right lower leg: Edema present.      Left lower leg: No edema.   Skin:     General: Skin is warm.   Neurological:      Mental Status: She is alert and oriented to person, place, and time. Mental status is at " baseline.      Cranial Nerves: Cranial nerve deficit: left hand  is weak, left UE motor is 4/5.      Motor: Weakness present.   Psychiatric:         Behavior: Behavior normal.             Code Status: Prior  Advance Directive and Living Will:      Power of :    POLST:      Counseling / Coordination of Care:   Total floor / unit time spent today 55 minutes.

## 2024-05-03 NOTE — ASSESSMENT & PLAN NOTE
Stroke ruled out  Patient presented with left sided weakness  Feels the  is poor on the left  No prior h/o stroke  Ct head is  unremarkable  MRI negative for acute stroke.  PT/OT recommendation appreciated  Plans the bedside swallow evaluation, and tolerating diet without any difficulties, has good gag reflex  Discussed the case with neurology, since patient is still has left upper extremity weakness.  Recommendation at this time to follow-up neurology outpatient basis for further evaluation-discussed the recommendation with patient and family member at the bedside.  Verbalizes to understand and agrees.  At this time, we are discharging patient back home.

## 2024-05-06 NOTE — UTILIZATION REVIEW
NOTIFICATION OF OBSERVATION ADMISSION   AUTHORIZATION REQUEST   SERVICING FACILITY:   [place] [address]  Tax ID: [id number]  NPI: [id number] ATTENDING PROVIDER:  Attending Name and NPI#: Jc Ayala Md [[id number]]  Address: [address] [place] [address]  Phone: [phone]   ADMISSION INFORMATION:  Place of Service: [facility]  Place of Service Code: 22 CPT Code:   Admitting Diagnosis Code/Description:  Hypertension [I10]  Stroke-like symptom [R29.90]  Observation Admission Date/Time:   Discharge Date/Time: 5/3/2024  5:05 PM     UTILIZATION REVIEW CONTACT:  Awa Brennan, Utilization   Network Utilization Review Department  Phone: [phone]  Fax [phone]  Email: [email or web address]  Contact for approvals/pending authorizations, clinical reviews, and discharge.   PHYSICIAN ADVISORY SERVICES:  Medical Necessity Denial & Bjah-cl-Ioxd Review  Phone: [phone]  Fax: [phone]  Email: [email or web address]     DISCHARGE SUPPORT TEAM:  For Patients Discharge Needs & Updates  Phone: [phone] opt. 2 Fax: [phone]  Email: [email or web address]